# Patient Record
Sex: FEMALE | Race: WHITE | NOT HISPANIC OR LATINO | Employment: PART TIME | URBAN - METROPOLITAN AREA
[De-identification: names, ages, dates, MRNs, and addresses within clinical notes are randomized per-mention and may not be internally consistent; named-entity substitution may affect disease eponyms.]

---

## 2017-05-02 ENCOUNTER — HOSPITAL ENCOUNTER (OUTPATIENT)
Dept: RADIOLOGY | Facility: HOSPITAL | Age: 57
Discharge: HOME | End: 2017-05-02
Attending: OBSTETRICS & GYNECOLOGY
Payer: COMMERCIAL

## 2017-05-02 DIAGNOSIS — N83.209 OVARIAN CYST: ICD-10-CM

## 2017-05-02 PROCEDURE — 76856 US EXAM PELVIC COMPLETE: CPT

## 2017-05-02 PROCEDURE — 76830 TRANSVAGINAL US NON-OB: CPT

## 2017-05-07 ENCOUNTER — GENERIC CONVERSION - ENCOUNTER (OUTPATIENT)
Dept: OTHER | Facility: OTHER | Age: 57
End: 2017-05-07

## 2017-07-19 DIAGNOSIS — D25.9 LEIOMYOMA OF UTERUS: ICD-10-CM

## 2017-07-19 DIAGNOSIS — I10 ESSENTIAL (PRIMARY) HYPERTENSION: ICD-10-CM

## 2017-07-19 DIAGNOSIS — R73.09 OTHER ABNORMAL GLUCOSE: ICD-10-CM

## 2017-07-19 DIAGNOSIS — Z12.31 ENCOUNTER FOR SCREENING MAMMOGRAM FOR MALIGNANT NEOPLASM OF BREAST: ICD-10-CM

## 2017-07-19 DIAGNOSIS — E78.5 HYPERLIPIDEMIA: ICD-10-CM

## 2017-07-21 ENCOUNTER — APPOINTMENT (OUTPATIENT)
Dept: LAB | Facility: CLINIC | Age: 57
End: 2017-07-21
Payer: COMMERCIAL

## 2017-07-21 DIAGNOSIS — I10 ESSENTIAL (PRIMARY) HYPERTENSION: ICD-10-CM

## 2017-07-21 DIAGNOSIS — E78.5 HYPERLIPIDEMIA: ICD-10-CM

## 2017-07-21 DIAGNOSIS — R73.09 OTHER ABNORMAL GLUCOSE: ICD-10-CM

## 2017-07-21 LAB
ALBUMIN SERPL BCP-MCNC: 3.8 G/DL (ref 3.5–5)
ALP SERPL-CCNC: 85 U/L (ref 46–116)
ALT SERPL W P-5'-P-CCNC: 36 U/L (ref 12–78)
ANION GAP SERPL CALCULATED.3IONS-SCNC: 8 MMOL/L (ref 4–13)
AST SERPL W P-5'-P-CCNC: 22 U/L (ref 5–45)
BASOPHILS # BLD AUTO: 0.03 THOUSANDS/ΜL (ref 0–0.1)
BASOPHILS NFR BLD AUTO: 1 % (ref 0–1)
BILIRUB SERPL-MCNC: 0.57 MG/DL (ref 0.2–1)
BUN SERPL-MCNC: 14 MG/DL (ref 5–25)
CALCIUM SERPL-MCNC: 9.5 MG/DL (ref 8.3–10.1)
CHLORIDE SERPL-SCNC: 102 MMOL/L (ref 100–108)
CHOLEST SERPL-MCNC: 179 MG/DL (ref 50–200)
CO2 SERPL-SCNC: 28 MMOL/L (ref 21–32)
CREAT SERPL-MCNC: 0.9 MG/DL (ref 0.6–1.3)
EOSINOPHIL # BLD AUTO: 0.13 THOUSAND/ΜL (ref 0–0.61)
EOSINOPHIL NFR BLD AUTO: 2 % (ref 0–6)
ERYTHROCYTE [DISTWIDTH] IN BLOOD BY AUTOMATED COUNT: 13.1 % (ref 11.6–15.1)
EST. AVERAGE GLUCOSE BLD GHB EST-MCNC: 111 MG/DL
GFR SERPL CREATININE-BSD FRML MDRD: >60 ML/MIN/1.73SQ M
GLUCOSE P FAST SERPL-MCNC: 87 MG/DL (ref 65–99)
HBA1C MFR BLD: 5.5 % (ref 4.2–6.3)
HCT VFR BLD AUTO: 41 % (ref 34.8–46.1)
HDLC SERPL-MCNC: 58 MG/DL (ref 40–60)
HGB BLD-MCNC: 14.1 G/DL (ref 11.5–15.4)
LDLC SERPL CALC-MCNC: 99 MG/DL (ref 0–100)
LYMPHOCYTES # BLD AUTO: 2.13 THOUSANDS/ΜL (ref 0.6–4.47)
LYMPHOCYTES NFR BLD AUTO: 38 % (ref 14–44)
MCH RBC QN AUTO: 29.2 PG (ref 26.8–34.3)
MCHC RBC AUTO-ENTMCNC: 34.4 G/DL (ref 31.4–37.4)
MCV RBC AUTO: 85 FL (ref 82–98)
MONOCYTES # BLD AUTO: 0.54 THOUSAND/ΜL (ref 0.17–1.22)
MONOCYTES NFR BLD AUTO: 10 % (ref 4–12)
NEUTROPHILS # BLD AUTO: 2.76 THOUSANDS/ΜL (ref 1.85–7.62)
NEUTS SEG NFR BLD AUTO: 49 % (ref 43–75)
NRBC BLD AUTO-RTO: 0 /100 WBCS
PLATELET # BLD AUTO: 254 THOUSANDS/UL (ref 149–390)
PMV BLD AUTO: 11 FL (ref 8.9–12.7)
POTASSIUM SERPL-SCNC: 3.9 MMOL/L (ref 3.5–5.3)
PROT SERPL-MCNC: 7.6 G/DL (ref 6.4–8.2)
RBC # BLD AUTO: 4.83 MILLION/UL (ref 3.81–5.12)
SODIUM SERPL-SCNC: 138 MMOL/L (ref 136–145)
TRIGL SERPL-MCNC: 111 MG/DL
TSH SERPL DL<=0.05 MIU/L-ACNC: 1.22 UIU/ML (ref 0.36–3.74)
WBC # BLD AUTO: 5.61 THOUSAND/UL (ref 4.31–10.16)

## 2017-07-21 PROCEDURE — 83036 HEMOGLOBIN GLYCOSYLATED A1C: CPT

## 2017-07-21 PROCEDURE — 80061 LIPID PANEL: CPT

## 2017-07-21 PROCEDURE — 85025 COMPLETE CBC W/AUTO DIFF WBC: CPT

## 2017-07-21 PROCEDURE — 80053 COMPREHEN METABOLIC PANEL: CPT

## 2017-07-21 PROCEDURE — 36415 COLL VENOUS BLD VENIPUNCTURE: CPT

## 2017-07-21 PROCEDURE — 84443 ASSAY THYROID STIM HORMONE: CPT

## 2017-07-28 ENCOUNTER — ALLSCRIPTS OFFICE VISIT (OUTPATIENT)
Dept: OTHER | Facility: OTHER | Age: 57
End: 2017-07-28

## 2017-08-15 ENCOUNTER — ALLSCRIPTS OFFICE VISIT (OUTPATIENT)
Dept: OTHER | Facility: OTHER | Age: 57
End: 2017-08-15

## 2017-10-02 DIAGNOSIS — D25.9 LEIOMYOMA OF UTERUS: ICD-10-CM

## 2017-12-12 ENCOUNTER — APPOINTMENT (OUTPATIENT)
Dept: LAB | Facility: HOSPITAL | Age: 57
End: 2017-12-12
Payer: COMMERCIAL

## 2017-12-12 ENCOUNTER — ALLSCRIPTS OFFICE VISIT (OUTPATIENT)
Dept: OTHER | Facility: OTHER | Age: 57
End: 2017-12-12

## 2017-12-12 DIAGNOSIS — J06.9 ACUTE UPPER RESPIRATORY INFECTION: ICD-10-CM

## 2017-12-12 PROCEDURE — 87070 CULTURE OTHR SPECIMN AEROBIC: CPT

## 2017-12-13 NOTE — PROGRESS NOTES
Assessment    1  Upper respiratory infection, acute (465 9) (J06 9)    Plan  Upper respiratory infection, acute    · Amoxicillin 875 MG Oral Tablet; Take 1 tablet twice daily   · Cheratussin -10 MG/5ML Oral Syrup; TAKE 5 - 10 ML EVERY 4 TO 6HOURS AS NEEDED FOR COUGH   · (1) THROAT CULTURE (CULTURE, UPPER RESPIRATORY); Status:Active; Requestedfor:51Otp3595;     Discussion/Summary    Upper respiratory infection  Throat culture is pending  Will treat with amoxicillin, Robitussin with codeine  I advised patient to use ibuprofen over-the-counter as needed  Advised rest, fluids, gargling  advised patient to contact me in a few days if symptoms are not improving significantly  The patient was counseled regarding instructions for management,-- impressions  Possible side effects of new medications were reviewed with the patient/guardian today  The treatment plan was reviewed with the patient/guardian  The patient/guardian understands and agrees with the treatment plan      Chief Complaint  Patient is here c/o a sore throat, cough, ear pain and nasal congestion x's 5+ days  All medications were reviewed and updated with the patient  History of Present Illness  HPI: cold s/o for a few daysfever now, started with temperature of 101 5cold this fall  sinus pressure, coughing all night , earache, sore throat , swollen glands  3 y/old granddaughter is sick      Review of Systems   Constitutional: feeling poorly-- and-- feeling tired  ENT: earache,-- sore throat-- and-- nasal discharge  Cardiovascular: no complaints of slow or fast heart rate, no chest pain, no palpitations, no leg claudication or lower extremity edema  Respiratory: cough, but-- no shortness of breath-- and-- no wheezing  Gastrointestinal: no complaints of abdominal pain, no constipation, no nausea or diarrhea, no vomiting, no bloody stools  Integumentary: no complaints of skin rash or lesion, no itching or dry skin, no skin wounds    Neurological: no complaints of headache, no confusion, no numbness or tingling, no dizziness or fainting  Active Problems  1  Abnormal glucose (790 29) (R73 09)   2  Cellulitis of leg (682 6) (L03 119)   3  Cellulitis of right lower extremity (682 6) (L03 115)   4  Encounter for annual routine gynecological examination (V72 31) (Z01 419)   5  Encounter for gynecological examination (V72 31) (Z01 419)   6  Encounter for screening mammogram for breast cancer (V76 12) (Z12 31)   7  Hyperlipidemia (272 4) (E78 5)   8  Hypertension (401 9) (I10)   9  Need for prophylactic vaccination and inoculation against influenza (V04 81) (Z23)   10  Obesity (BMI 30 0-34 9) (278 00) (E66 9)   11  Ovarian cyst (620 2) (N83 209)   12  Submucous and subserous leiomyoma of uterus (218 0,218 2) (D25 0,D25 2)   13  Uterine leiomyoma (218 9) (D25 9)    Past Medical History  1  History of anemia (V12 3) (Z86 2)   2  History of hypercholesterolemia (V12 29) (Z86 39)   3  Hypertension (401 9) (I10)   4  History of Neck pain (723 1) (M54 2)  Active Problems And Past Medical History Reviewed: The active problems and past medical history were reviewed and updated today  Family History  Mother    1  Family history of Colon Cancer (V16 0)  Family History Reviewed: The family history was reviewed and updated today  Social History   ·    · Never A Smoker  The social history was reviewed and updated today  Surgical History    1  History of Colonoscopy (Fiberoptic)   2  History of Exploratory Laparoscopy   3  History of Exploratory Laparotomy  Surgical History Reviewed: The surgical history was reviewed and updated today  Current Meds   1  Aspirin 81 MG TABS; Take 1 tablet daily; Therapy: (Recorded:95Zgd0520) to Recorded   2  Calcium 600+D 600-400 MG-UNIT Oral Tablet; Take as directed; Therapy: (Recorded:09Kxj3927) to Recorded   3  Fish Oil 1000 MG Oral Capsule; Therapy: (Recorded:64Okv5874) to Recorded   4

## 2017-12-15 ENCOUNTER — GENERIC CONVERSION - ENCOUNTER (OUTPATIENT)
Dept: OTHER | Facility: OTHER | Age: 57
End: 2017-12-15

## 2017-12-15 LAB — BACTERIA THROAT CULT: NORMAL

## 2018-01-11 NOTE — PROGRESS NOTES
Assessment   1  Hyperlipidemia (272 4) (E78 5)  2  Hypertension (401 9) (I10)  3  Obesity (BMI 30 0-34 9) (278 00) (E66 9)  4  History of Colonoscopy (Fiberoptic)   · 12/09 Jesus Daniels  5  Encounter for preventive health examination (V70 0) (Z00 00)1      1 Amended By: Wendy Pina; Jul 30 2016 10:24 PM EST    Plan  Abnormal glucose, Hyperlipidemia, Hypertension, Obesity (BMI 30 0-34  9)    · (1) HEMOGLOBIN A1C; Status:Resulted - Requires Verification;   Done: 99EZR3608  09:45AM  Hyperlipidemia, Hypertension, Obesity (BMI 30 0-34  9)    · (1) CBC/PLT/DIFF; Status:Resulted - Requires Verification;   Done: 56OZE1756 09:45AM   · (1) COMPREHENSIVE METABOLIC PANEL; Status:Resulted - Requires Verification;    Done: 37SHR1659 09:45AM   · (1) LIPID PANEL FASTING W DIRECT LDL REFLEX; Status:Resulted - Requires  Verification;   Done: 42MZN9296 09:45AM   · (1) TSH; Status:Resulted - Requires Verification;   Done: 88TFX8885 09:45AM    Discussion/Summary  health maintenance visit Currently, she eats a healthy diet and has an inadequate exercise regimen  cervical cancer screening is current Breast cancer screening: mammogram is needed every year  Colorectal cancer screening: colorectal cancer screening is current  Screening lab work includes hemoglobin, glucose, lipid profile and thyroid function testing  Advice and education were given regarding nutrition, aerobic exercise, weight bearing exercise and weight loss  Annual well exam   Hypertension-well controlled, continue lisinopril/HCTZ  Hyperlipidemia-continue pravastatin  We will await results of bloodwork  We had long discussion about weight loss, exercise  Patient is up-to-date with health screenings  Pending gynecological exam and mammography within next few weeks  Follow-up pending labs, annually and as needed  Possible side effects of new medications were reviewed with the patient/guardian today        Chief Complaint  Pt is here for an annual physical  Pt offers no complaints  All meds/allergies reviewed with pt  History of Present Illness  HM, Adult Female: The patient is being seen for a health maintenance evaluation  The last health maintenance visit was 1 year(s) ago  General Health: The patient's health since the last visit is described as good  She has regular dental visits  She denies vision problems  She denies hearing loss  Lifestyle:  She consumes a diverse and healthy diet  She has weight concerns  She does not exercise regularly  She does not use tobacco  She denies drug use  Reproductive health: the patient is postmenopausal    Screening: cancer screening reviewed and updated  metabolic screening reviewed and updated  risk screening reviewed and updated  HPI: Dr Chicas Hazard - GYN, pending OV   mammography - scheduled next week  UTD with colonoscopy     Hyperlipidemia (Follow-Up): Comorbid Illnesses: hypertension  She has no significant interval events  Symptoms: The patient is currently asymptomatic  Hypertension (Follow-Up): The patient presents for follow-up of essential hypertension  The patient states she has been doing well with her blood pressure control since the last visit  Symptoms: The patient is currently asymptomatic  denies impaired vision, denies dyspnea, denies chest pain, denies intermittent leg claudication and denies lower extremity edema  Medications: the patient is adherent with her medication regimen  She denies medication side effects  Medication(s): a diuretic and an ACE inhibitor  The patient is due for a lipid panel  Review of Systems    Constitutional: No fever, no chills, feels well, no tiredness, no recent weight gain or weight loss  Eyes: No complaints of eye pain, no red eyes, no eyesight problems, no discharge, no dry eyes, no itching of eyes  ENT: no complaints of earache, no loss of hearing, no nose bleeds, no nasal discharge, no sore throat, no hoarseness     Cardiovascular: No complaints of slow heart rate, no fast heart rate, no chest pain, no palpitations, no leg claudication, no lower extremity edema  Respiratory: No complaints of shortness of breath, no wheezing, no cough, no SOB on exertion, no orthopnea, no PND  Gastrointestinal: No complaints of abdominal pain, no constipation, no nausea or vomiting, no diarrhea, no bloody stools  Genitourinary: No complaints of dysuria, no incontinence, no pelvic pain, no dysmenorrhea, no vaginal discharge or bleeding  Musculoskeletal: No complaints of arthralgias, no myalgias, no joint swelling or stiffness, no limb pain or swelling  Integumentary: No complaints of skin rash or lesions, no itching, no skin wounds, no breast pain or lump  Neurological: No complaints of headache, no confusion, no convulsions, no numbness, no dizziness or fainting, no tingling, no limb weakness, no difficulty walking  Psychiatric: Not suicidal, no sleep disturbance, no anxiety or depression, no change in personality, no emotional problems  Endocrine: No complaints of proptosis, no hot flashes, no muscle weakness, no deepening of the voice, no feelings of weakness  Hematologic/Lymphatic: No complaints of swollen glands, no swollen glands in the neck, does not bleed easily, does not bruise easily  Active Problems   1  Hyperlipidemia (272 4) (E78 5)  2  Hypertension (401 9) (I10)  3  Obesity (BMI 30 0-34 9) (278 00) (E66 9)    Past Medical History    · History of anemia (V12 3) (Z86 2)   · History of Neck pain (723 1) (M54 2)    Surgical History    · History of Colonoscopy (Fiberoptic)    Family History  Mother    · Family history of Colon Cancer (V16 0)    Social History    ·    · Never A Smoker    Current Meds  1  Aspirin 81 MG TABS; Take 1 tablet daily; Therapy: (Recorded:04Wvp2740) to Recorded  2  Calcium 600+D 600-400 MG-UNIT Oral Tablet; Take as directed; Therapy: (Recorded:84Wrc4491) to Recorded  3   Fish Oil 1000 MG Oral Capsule; Therapy: (Recorded:22Rmj1295) to Recorded  4  Lisinopril-Hydrochlorothiazide 10-12 5 MG Oral Tablet; take one tablet by mouth every   day; Therapy: 87OMH6978 to (Last Rx:12Jun2016)  Requested for: 12Jun2016 Ordered  5  Pravastatin Sodium 20 MG Oral Tablet; take one tablet by mouth every day; Therapy: 48OFP5232 to (Last Rx:12Jun2016)  Requested for: 12Jun2016 Ordered    Allergies   1  No Known Drug Allergies    Vitals   Recorded: 60Nbi8771 09:26AM Recorded: 25LOW7526 41:35HY   Systolic 269 934   Diastolic 74 68   Heart Rate  70   Temperature  98 7 F   Height  5 ft 5 in   Weight  183 lb 8 0 oz   BMI Calculated  30 54   BSA Calculated  1 91     Physical Exam    Constitutional   General appearance: No acute distress, well appearing and well nourished  Head and Face   Head and face: Normal     Eyes   Conjunctiva and lids: No swelling, erythema or discharge  Pupils and irises: Equal, round, reactive to light  Neck   Neck: Supple, symmetric, trachea midline, no masses  Thyroid: Normal, no thyromegaly  Pulmonary   Respiratory effort: No increased work of breathing or signs of respiratory distress  Auscultation of lungs: Clear to auscultation  Cardiovascular   Auscultation of heart: Normal rate and rhythm, normal S1 and S2, no murmurs  Carotid pulses: 2+ bilaterally  Abdominal aorta: Normal     Femoral pulses: 2+ bilaterally  Examination of extremities for edema and/or varicosities: Normal     Chest   Chest: Normal     Abdomen   Abdomen: Non-tender, no masses  Liver and spleen: No hepatomegaly or splenomegaly  Musculoskeletal   Gait and station: Normal     Neurologic   Cranial nerves: Cranial nerves II-XII intact  Psychiatric   Judgment and insight: Normal     Orientation to person, place, and time: Normal     Recent and remote memory: Intact      Mood and affect: Normal        Results/Data  (1) CBC/PLT/DIFF 95ITP5348 09:45AM Gillie December TW Order Number: [id number]     Test Name Result Flag Reference   WBC COUNT 5 65 Thousand/uL  4 31-10 16   RBC COUNT 5 10 Million/uL  3 81-5 12   HEMOGLOBIN 14 9 g/dL  11 5-15 4   HEMATOCRIT 43 3 %  34 8-46  1   MCV 85 fL  82-98   MCH 29 2 pg  26 8-34 3   MCHC 34 4 g/dL  31 4-37 4   RDW 12 9 %  11 6-15 1   MPV 11 0 fL  8 9-12 7   PLATELET COUNT 515 Thousands/uL  149-390   nRBC AUTOMATED 0 /100 WBCs     NEUTROPHILS RELATIVE PERCENT 50 %  43-75   LYMPHOCYTES RELATIVE PERCENT 38 %  14-44   MONOCYTES RELATIVE PERCENT 9 %  4-12   EOSINOPHILS RELATIVE PERCENT 2 %  0-6   BASOPHILS RELATIVE PERCENT 1 %  0-1   NEUTROPHILS ABSOLUTE COUNT 2 79 Thousands/?L  1 85-7 62   LYMPHOCYTES ABSOLUTE COUNT 2 17 Thousands/?L  0 60-4 47   MONOCYTES ABSOLUTE COUNT 0 51 Thousand/?L  0 17-1 22   EOSINOPHILS ABSOLUTE COUNT 0 13 Thousand/?L  0 00-0 61   BASOPHILS ABSOLUTE COUNT 0 03 Thousands/?L  0 00-0 10   - Patient Instructions: This bloodwork is non-fasting  Please drink two glasses of water morning of bloodwork  - Patient Instructions: This bloodwork is non-fasting  Please drink two glasses of water morning of bloodwork  Future Appointments    Date/Time Provider Specialty Site   07/28/2017 08:00 AM ROCHELLE Hughes  Family Medicine [address] [place] [address]     Signatures   Electronically signed by :  ROCHELLE Daugherty ; Jul 30 2016 10:25PM EST                       (Author)

## 2018-01-11 NOTE — RESULT NOTES
Verified Results  * [facility]PLETE [facility]) 47HNG4287 10:41AM Yovani Capellan Order Number: [id number]    - Patient Instructions: To schedule this appointment, please contact Central Scheduling at [phone]   Order Number: [id number]    - Patient Instructions: To schedule this appointment, please contact Central Scheduling at [phone]  Test Name Result Flag Reference   US PELVIS COMPLETE (TRANSABDOMINAL AND TRANSVAGINAL) (Report)     PELVIC ULTRASOUND, COMPLETE     INDICATION: Ovarian cyst  History of fibroids  LMP at age 47  COMPARISON: Pelvic ultrasound 6/2/2011     TECHNIQUE:  Transabdominal pelvic ultrasound was performed in sagittal and transverse planes with a curvilinear transducer  Additional transvaginal imaging was performed to better evaluate the endometrium and ovaries  Imaging included volumetric    sweeps as well as traditional still imaging technique  FINDINGS:     UTERUS:   The uterus is anteverted in position, measuring 11 6 x 4 9 x 5 3 cm  Heterogeneous myometrium  4 3 x 4 4 x 4 7 cm posterior fundal fibroid, previously 3 9 x 3 4 x 3 8 cm  This appears to displace the endometrial stripe anteriorly  The cervix shows no suspicious abnormality  Small nabothian cysts  ENDOMETRIUM:    Endometrium measures 7 mm thickness  Homogenous without focal mass detected  OVARIES/ADNEXA:   Right ovary: 2 5 x 1 8 x 2 3 cm  1 4 x 1 0 x 1 7 cm hyperechoic focus is evident, less conspicuous  Previously a 1 9 x 1 8 x 2 1 cm heterogeneously hyperechoic focus was noted  Again this may represent a dermoid  Doppler flow within normal limits  Left ovary: Not visualized  No suspicious adnexal mass or loculated collections  There is no free fluid  IMPRESSION:      1  Thickening of the endometrium measuring 7 mm  Correlate with any clinical symptoms and consider biopsy       2  4 7 cm posterior fundal fibroid, mildly increased from prior study  Given enlargement from the previous study in a postmenopausal female, closer ultrasound surveillance is warranted  Recommend follow-up ultrasound in 3-6 months  3  Probable right ovarian dermoid, less conspicuous         ##sigslh##sigslh       Workstation performed: [id number]     Signed by:   Cheryl Moise DO   5/3/17

## 2018-01-11 NOTE — PROGRESS NOTES
Chief Complaint  Patient is here to receive the influenza vaccination  Active Problems    1  Abnormal glucose (790 29) (R73 09)   2  Cellulitis of leg (682 6) (L03 119)   3  Cellulitis of right lower extremity (682 6) (L03 115)   4  Encounter for gynecological examination (V72 31) (Z01 419)   5  Encounter for screening mammogram for breast cancer (V76 12) (Z12 31)   6  Hyperlipidemia (272 4) (E78 5)   7  Hypertension (401 9) (I10)   8  Need for prophylactic vaccination and inoculation against influenza (V04 81) (Z23)   9  Obesity (BMI 30 0-34 9) (278 00) (E66 9)   10  Ovarian cyst (620 2) (N83 209)    Current Meds   1  Aspirin 81 MG TABS; Take 1 tablet daily; Therapy: (Recorded:93Erh2995) to Recorded   2  Calcium 600+D 600-400 MG-UNIT Oral Tablet; Take as directed; Therapy: (Recorded:97Aut3706) to Recorded   3  Doxycycline Hyclate 100 MG Oral Capsule; TAKE 1 CAPSULE Twice daily After Meals; Therapy: 31GIS4818 to (Complete:17Nov2016)  Requested for: 92ITH9079; Last   Rx:03Nov2016 Ordered   4  Fish Oil 1000 MG Oral Capsule; Therapy: (Recorded:41Kyc0481) to Recorded   5  Lisinopril-Hydrochlorothiazide 10-12 5 MG Oral Tablet; take one tablet by mouth every   day; Therapy: 33GJL0555 to (Last Rx:09Pth7540)  Requested for: 42Qmh6265 Ordered   6  Pravastatin Sodium 20 MG Oral Tablet; take one tablet by mouth every day; Therapy: 79OZO7590 to (Last Rx:81Myv2756)  Requested for: 73Wll2317 Ordered    Allergies    1  No Known Drug Allergies    Vitals  Signs    Temperature: 97 2 F    Plan  Need for prophylactic vaccination and inoculation against influenza    · Fluzone Quadrivalent 0 5 ML Intramuscular Suspension    Future Appointments    Date/Time Provider Specialty Site   07/28/2017 08:00 AM ROCHELLE Zamorano  Family Medicine [address] [place] [address]     Signatures   Electronically signed by :  ROCHELLE Gonzalez ; Nov 8 2016  4:37PM EST                       (Author)

## 2018-01-13 VITALS
BODY MASS INDEX: 31.65 KG/M2 | DIASTOLIC BLOOD PRESSURE: 78 MMHG | HEIGHT: 65 IN | SYSTOLIC BLOOD PRESSURE: 120 MMHG | WEIGHT: 190 LBS

## 2018-01-16 NOTE — PROGRESS NOTES
Assessment    1  Encounter for preventive health examination (V70 0) (Z00 00)   2  Hyperlipidemia (272 4) (E78 5)   3  Hypertension (401 9) (I10)   4  Obesity (BMI 30 0-34 9) (278 00) (E66 9)    Plan  Hyperlipidemia, Hypertension, Obesity (BMI 30 0-34  9)    · (1) CBC/PLT/DIFF; Status:Active; Requested [id number];    · (1) COMPREHENSIVE METABOLIC PANEL; Status:Active; Requested [id number];    · (1) LIPID PANEL FASTING W DIRECT LDL REFLEX; Status:Active; Requested  [id number];    · (1) TSH; Status:Active; Requested [id number]; Discussion/Summary  health maintenance visit Currently, she eats a healthy diet  cervical cancer screening is current cervical cancer screening is needed every year Breast cancer screening: mammogram is current and mammogram is needed every year  Colorectal cancer screening: the risks and benefits of colorectal cancer screening were discussed, colonoscopy has been ordered, colorectal cancer screening is managed by Dr Daniels and Patient is past due colonoscopy  Screening lab work includes hemoglobin, glucose, lipid profile and thyroid function testing  Annual well exam   We discussed exercise, healthy diet, weight management  Patient is up-to-date with gynecological exam and mammography ; she should be scheduling follow-up colonoscopy now  Hypertension-continue lisinopril HCTZ  Hyperlipidemia-continue pravastatin  Follow up annually and as needed  Possible side effects of new medications were reviewed with the patient/guardian today  The treatment plan was reviewed with the patient/guardian  The patient/guardian understands and agrees with the treatment plan      Chief Complaint  Patient is here for a yearly physical  All medications were reviewed and updated with the patient  History of Present Illness  , Adult Female: The patient is being seen for a health maintenance evaluation  The last health maintenance visit was 1 year(s) ago  General Health:  The patient's health since the last visit is described as good  Lifestyle:  She consumes a diverse and healthy diet  She has weight concerns  She does not exercise regularly  She does not use tobacco    Reproductive health: the patient is postmenopausal    Screening: cancer screening reviewed and updated  metabolic screening reviewed and updated  risk screening reviewed and updated  HPI: pending GYN exam and mammo in 8/2017   Pelvic US in 5/2017   feels well   Patient remains on the medications for hypertension and hyperlipidemia  She denies chest pain, palpitations, shortness of breath or dizziness  Patient is concerned about excessive weight and is committed to start cholesterol changes, exercise and healthy diet  Review of Systems    Constitutional: No fever, no chills, feels well, no tiredness, no recent weight gain or weight loss  Eyes: No complaints of eye pain, no red eyes, no eyesight problems, no discharge, no dry eyes, no itching of eyes  ENT: no complaints of earache, no loss of hearing, no nose bleeds, no nasal discharge, no sore throat, no hoarseness  Cardiovascular: No complaints of slow heart rate, no fast heart rate, no chest pain, no palpitations, no leg claudication, no lower extremity edema  Respiratory: No complaints of shortness of breath, no wheezing, no cough, no SOB on exertion, no orthopnea, no PND  Gastrointestinal: No complaints of abdominal pain, no constipation, no nausea or vomiting, no diarrhea, no bloody stools  Genitourinary: No complaints of dysuria, no incontinence, no pelvic pain, no dysmenorrhea, no vaginal discharge or bleeding  Musculoskeletal: No complaints of arthralgias, no myalgias, no joint swelling or stiffness, no limb pain or swelling  Integumentary: No complaints of skin rash or lesions, no itching, no skin wounds, no breast pain or lump     Neurological: No complaints of headache, no confusion, no convulsions, no numbness, no dizziness or fainting, no tingling, no limb weakness, no difficulty walking  Psychiatric: Not suicidal, no sleep disturbance, no anxiety or depression, no change in personality, no emotional problems  Endocrine: No complaints of proptosis, no hot flashes, no muscle weakness, no deepening of the voice, no feelings of weakness  Hematologic/Lymphatic: No complaints of swollen glands, no swollen glands in the neck, does not bleed easily, does not bruise easily  Active Problems    1  Abnormal glucose (790 29) (R73 09)   2  Cellulitis of leg (682 6) (L03 119)   3  Cellulitis of right lower extremity (682 6) (L03 115)   4  Encounter for gynecological examination (V72 31) (Z01 419)   5  Encounter for screening mammogram for breast cancer (V76 12) (Z12 31)   6  Hyperlipidemia (272 4) (E78 5)   7  Hypertension (401 9) (I10)   8  Need for prophylactic vaccination and inoculation against influenza (V04 81) (Z23)   9  Obesity (BMI 30 0-34 9) (278 00) (E66 9)   10  Ovarian cyst (620 2) (N83 209)   11  Uterine leiomyoma (218 9) (D25 9)    Past Medical History    · History of anemia (V12 3) (Z86 2)   · History of hypercholesterolemia (V12 29) (Z86 39)   · Hypertension (401 9) (I10)   · History of Neck pain (723 1) (M54 2)    Surgical History    · History of Colonoscopy (Fiberoptic)   · History of Exploratory Laparoscopy   · History of Exploratory Laparotomy    Family History  Mother    · Family history of Colon Cancer (V16 0)    Social History    ·    · Never A Smoker    Current Meds   1  Aspirin 81 MG TABS; Take 1 tablet daily; Therapy: (Recorded:01Rxe3811) to Recorded   2  Calcium 600+D 600-400 MG-UNIT Oral Tablet; Take as directed; Therapy: (Recorded:56Ywm3927) to Recorded   3  Fish Oil 1000 MG Oral Capsule; Therapy: (Recorded:60Gey4255) to Recorded   4  Lisinopril-Hydrochlorothiazide 10-12 5 MG Oral Tablet; take one tablet by mouth every   day;    Therapy: 79MFA3492 to (Last Rx:86Qed6189)  Requested for: 07Sep2016 Ordered   5  Pravastatin Sodium 20 MG Oral Tablet; take one tablet by mouth every day; Therapy: 45RCG3560 to (Last Rx:99Usu2625)  Requested for: 88Acw0876 Ordered    Allergies    1  No Known Drug Allergies    Vitals   Recorded: 28Jul2017 08:01AM   Temperature 98 3 F   Heart Rate 64   Respiration 12   Systolic 242   Diastolic 82   Height 5 ft 5 in   Weight 189 lb 8 oz   BMI Calculated 31 53   BSA Calculated 1 93     Physical Exam    Constitutional   General appearance: No acute distress, well appearing and well nourished  Eyes   Conjunctiva and lids: No swelling, erythema or discharge  Pupils and irises: Equal, round, reactive to light  Ears, Nose, Mouth, and Throat   External inspection of ears and nose: Normal     Neck   Neck: Supple, symmetric, trachea midline, no masses  Thyroid: Normal, no thyromegaly  Pulmonary   Respiratory effort: No increased work of breathing or signs of respiratory distress  Auscultation of lungs: Clear to auscultation  Cardiovascular   Auscultation of heart: Normal rate and rhythm, normal S1 and S2, no murmurs  Carotid pulses: 2+ bilaterally  Abdominal aorta: Normal     Examination of extremities for edema and/or varicosities: Normal     Chest   Chest: Normal     Abdomen   Abdomen: Non-tender, no masses  Liver and spleen: No hepatomegaly or splenomegaly  Musculoskeletal   Gait and station: Normal     Neurologic   Cranial nerves: Cranial nerves II-XII intact  Psychiatric   Judgment and insight: Normal     Orientation to person, place, and time: Normal     Recent and remote memory: Intact  Mood and affect: Normal        Results/Data  (1) CBC/PLT/DIFF 10Xla3631 09:11AM Shana Marcelluss Order Number: [id number]     Test Name Result Flag Reference   WBC COUNT 5 61 Thousand/uL  4 31-10 16   RBC COUNT 4 83 Million/uL  3 81-5 12   HEMOGLOBIN 14 1 g/dL  11 5-15 4   HEMATOCRIT 41 0 %  34 8-46  1   MCV 85 fL  82-98   MCH 29 2 pg  26 8-34 3 MCHC 34 4 g/dL  31 4-37 4   RDW 13 1 %  11 6-15 1   MPV 11 0 fL  8 9-12 7   PLATELET COUNT 472 Thousands/uL  149-390   nRBC AUTOMATED 0 /100 WBCs     NEUTROPHILS RELATIVE PERCENT 49 %  43-75   LYMPHOCYTES RELATIVE PERCENT 38 %  14-44   MONOCYTES RELATIVE PERCENT 10 %  4-12   EOSINOPHILS RELATIVE PERCENT 2 %  0-6   BASOPHILS RELATIVE PERCENT 1 %  0-1   NEUTROPHILS ABSOLUTE COUNT 2 76 Thousands/? ??L  1 85-7 62   LYMPHOCYTES ABSOLUTE COUNT 2 13 Thousands/? ??L  0 60-4 47   MONOCYTES ABSOLUTE COUNT 0 54 Thousand/? ??L  0 17-1 22   EOSINOPHILS ABSOLUTE COUNT 0 13 Thousand/? ??L  0 00-0 61   BASOPHILS ABSOLUTE COUNT 0 03 Thousands/? ??L  0 00-0 10     (1) COMPREHENSIVE METABOLIC PANEL 03BEB9292 39:55YQ Wendy MICHELE Order Number: [id number]     Test Name Result Flag Reference   SODIUM 138 mmol/L  136-145   POTASSIUM 3 9 mmol/L  3 5-5 3   CHLORIDE 102 mmol/L  100-108   CARBON DIOXIDE 28 mmol/L  21-32   ANION GAP (CALC) 8 mmol/L  4-13   BLOOD UREA NITROGEN 14 mg/dL  5-25   CREATININE 0 90 mg/dL  0 60-1 30   Standardized to IDMS reference method   CALCIUM 9 5 mg/dL  8 3-10 1   BILI, TOTAL 0 57 mg/dL  0 20-1 00   ALK PHOSPHATAS 85 U/L     ALT (SGPT) 36 U/L  12-78   AST(SGOT) 22 U/L  5-45   ALBUMIN 3 8 g/dL  3 5-5 0   TOTAL PROTEIN 7 6 g/dL  6 4-8 2   eGFR Non-African American      >60 0 ml/min/1 73sq m   [facility] Disease Education Program recommendations are as follows:  GFR calculation is accurate only with a steady state creatinine  Chronic Kidney disease less than 60 ml/min/1 73 sq  meters  Kidney failure less than 15 ml/min/1 73 sq  meters  GLUCOSE FASTING 87 mg/dL  65-99     (1) TSH 71Ivl6832 09:11AM Wendy MICHELE Order Number: [id number]     Test Name Result Flag Reference   TSH 1 220 uIU/mL  0 358-3 740   Patients undergoing fluorescein dye angiography may retain small amounts of fluorescein in the body for 48-72 hours post procedure   Samples containing fluorescein can produce falsely depressed TSH values  If the patient had this procedure,a specimen should be resubmitted post fluorescein clearance  The recommended reference ranges for TSH during pregnancy are as follows:  First trimester 0 1 to 2 5 uIU/mL  Second trimester  0 2 to 3 0 uIU/mL  Third trimester 0 3 to 3 0 uIU/m     (1) LIPID PANEL FASTING W DIRECT LDL REFLEX 10Kep1783 09:11AM [employer] Order Number: [id number]     Test Name Result Flag Reference   CHOLESTEROL 179 mg/dL     LDL CHOLESTEROL CALCULATED 99 mg/dL  0-100   Triglyceride:         Normal              <150 mg/dl       Borderline High    150-199 mg/dl       High               200-499 mg/dl       Very High          >499 mg/dl  Cholesterol:         Desirable        <200 mg/dl      Borderline High  200-239 mg/dl      High             >239 mg/dl  HDL Cholesterol:        High    >59 mg/dL      Low     <41 mg/dL  LDL Cholesterol:        Optimal          <100 mg/dl        Near Optimal     100-129 mg/dl        Above Optimal          Borderline High   130-159 mg/dl          High              160-189 mg/dl          Very High        >189 mg/dl  LDL CALCULATED:    This screening LDL is a calculated result  It does not have the accuracy of the Direct Measured LDL in the monitoring of patients with hyperlipidemia and/or statin therapy  Direct Measure LDL (SZX101) must be ordered separately in these patients  TRIGLYCERIDES 111 mg/dL  <=150   Specimen collection should occur prior to N-Acetylcysteine or Metamizole administration due to the potential for falsely depressed results  HDL,DIRECT 58 mg/dL  40-60   Specimen collection should occur prior to Metamizole administration due to the potential for falsely depressed results  (1) HEMOGLOBIN A1C 40Evk8823 09:11AM [employer] Order Number: [id number]     Test Name Result Flag Reference   HEMOGLOBIN A1C 5 5 %  4 2-6 3   EST  AVG   GLUCOSE 111 mg/dl Future Appointments    Date/Time Provider Specialty Site   08/15/2017 08:30 AM Pepper Simmons, DO Obstetrics/Gynecology [facility]   07/30/2018 08:00 AM ROCHELLE Salvador  Family Medicine [address] [place] [address]     Signatures   Electronically signed by :  ROCHELLE Crowley ; Jul 30 2017 11:00PM EST                       (Author)

## 2018-01-18 NOTE — RESULT NOTES
Verified Results  (1) CBC/PLT/DIFF 80Nwx7117 09:45AM Rhys Valadez Order Number: [id number]     Test Name Result Flag Reference   WBC COUNT 5 65 Thousand/uL  4 31-10 16   RBC COUNT 5 10 Million/uL  3 81-5 12   HEMOGLOBIN 14 9 g/dL  11 5-15 4   HEMATOCRIT 43 3 %  34 8-46  1   MCV 85 fL  82-98   MCH 29 2 pg  26 8-34 3   MCHC 34 4 g/dL  31 4-37 4   RDW 12 9 %  11 6-15 1   MPV 11 0 fL  8 9-12 7   PLATELET COUNT 942 Thousands/uL  149-390   nRBC AUTOMATED 0 /100 WBCs     NEUTROPHILS RELATIVE PERCENT 50 %  43-75   LYMPHOCYTES RELATIVE PERCENT 38 %  14-44   MONOCYTES RELATIVE PERCENT 9 %  4-12   EOSINOPHILS RELATIVE PERCENT 2 %  0-6   BASOPHILS RELATIVE PERCENT 1 %  0-1   NEUTROPHILS ABSOLUTE COUNT 2 79 Thousands/?L  1 85-7 62   LYMPHOCYTES ABSOLUTE COUNT 2 17 Thousands/?L  0 60-4 47   MONOCYTES ABSOLUTE COUNT 0 51 Thousand/?L  0 17-1 22   EOSINOPHILS ABSOLUTE COUNT 0 13 Thousand/?L  0 00-0 61   BASOPHILS ABSOLUTE COUNT 0 03 Thousands/?L  0 00-0 10   - Patient Instructions: This bloodwork is non-fasting  Please drink two glasses of water morning of bloodwork  - Patient Instructions: This bloodwork is non-fasting  Please drink two glasses of water morning of bloodwork  (1) COMPREHENSIVE METABOLIC PANEL 15OGQ6416 37:80JE Susanna Stroud    Order Number: [id number]     Test Name Result Flag Reference   GLUCOSE,RANDM 92 mg/dL     If the patient is fasting, the ADA then defines impaired fasting glucose as > 100 mg/dL and diabetes as > or equal to 123 mg/dL     SODIUM 139 mmol/L  136-145   POTASSIUM 4 3 mmol/L  3 5-5 3   CHLORIDE 105 mmol/L  100-108   CARBON DIOXIDE 28 mmol/L  21-32   ANION GAP (CALC) 6 mmol/L  4-13   BLOOD UREA NITROGEN 15 mg/dL  5-25   CREATININE 0 92 mg/dL  0 60-1 30   Standardized to IDMS reference method   CALCIUM 9 4 mg/dL  8 3-10 1   BILI, TOTAL 0 48 mg/dL  0 20-1 00   ALK PHOSPHATAS 87 U/L     ALT (SGPT) 37 U/L  12-78   AST(SGOT) 22 U/L  5-45 ALBUMIN 3 8 g/dL  3 5-5 0   TOTAL PROTEIN 7 5 g/dL  6 4-8 2   eGFR Non-African American      >60 0 ml/min/1 73sq m   - Patient Instructions: This is a fasting blood test  Water, black tea or black coffee only after 9:00pm the night before test Drink 2 glasses of water the morning of test - Patient Instructions: This bloodwork is non-fasting  Please drink two glasses of   water morning of bloodwork  National Kidney Disease Education Program recommendations are as follows:  GFR calculation is accurate only with a steady state creatinine  Chronic Kidney disease less than 60 ml/min/1 73 sq  meters  Kidney failure less than 15 ml/min/1 73 sq  meters  (1) TSH [id number] 09:45AM Mi Olivera Order Number: [id number]     Test Name Result Flag Reference   TSH 1 190 uIU/mL  0 358-3 740   - Patient Instructions: This bloodwork is non-fasting  Please drink two glasses of water morning of bloodwork  - Patient Instructions: This is a fasting blood test  Water, black tea or black coffee only after 9:00pm the night before test Drink 2 glasses of water the morning of test - Patient Instructions: This bloodwork is non-fasting  Please drink two glasses of   water morning of bloodwork  Patients undergoing fluorescein dye angiography may retain small amounts of fluorescein in the body for 48-72 hours post procedure  Samples containing fluorescein can produce falsely depressed TSH values  If the patient had this procedure,a specimen should be resubmitted post fluorescein clearance            The recommended reference ranges for TSH during pregnancy are as follows:  First trimester 0 1 to 2 5 uIU/mL  Second trimester  0 2 to 3 0 uIU/mL  Third trimester 0 3 to 3 0 uIU/m     (1) LIPID PANEL FASTING W DIRECT LDL REFLEX [id number] 09:45AM Mi Olivera Order Number: [id number]     Test Name Result Flag Reference   CHOLESTEROL 188 mg/dL     LDL CHOLESTEROL CALCULATED 108 mg/dL H 0-100   - Patient Instructions: This is a fasting blood test  Water, black tea or black coffee only after 9:00pm the night before test   Drink 2 glasses of water the morning of test     - Patient Instructions: This is a fasting blood test  Water, black tea or black coffee only after 9:00pm the night before test Drink 2 glasses of water the morning of test - Patient Instructions: This bloodwork is non-fasting  Please drink two glasses of   water morning of bloodwork  Triglyceride:         Normal              <150 mg/dl       Borderline High    150-199 mg/dl       High               200-499 mg/dl       Very High          >499 mg/dl  Cholesterol:         Desirable        <200 mg/dl      Borderline High  200-239 mg/dl      High             >239 mg/dl  HDL Cholesterol:        High    >59 mg/dL      Low     <41 mg/dL  LDL Cholesterol:        Optimal          <100 mg/dl        Near Optimal     100-129 mg/dl        Above Optimal          Borderline High   130-159 mg/dl          High              160-189 mg/dl          Very High        >189 mg/dl  LDL CALCULATED:    This screening LDL is a calculated result  It does not have the accuracy of the Direct Measured LDL in the monitoring of patients with hyperlipidemia and/or statin therapy  Direct Measure LDL (TXY950) must be ordered separately in these patients  TRIGLYCERIDES 109 mg/dL  <=150   Specimen collection should occur prior to N-Acetylcysteine or Metamizole administration due to the potential for falsely depressed results  HDL,DIRECT 58 mg/dL  40-60   Specimen collection should occur prior to Metamizole administration due to the potential for falsely depressed results  (1) HEMOGLOBIN A1C 38Hte4799 09:45AM Inna Almazan Order Number: [id number]     Test Name Result Flag Reference   HEMOGLOBIN A1C 5 5 %  4 2-6 3   EST  AVG   GLUCOSE 111 mg/dl         Discussion/Summary   Dear Paul Luna,      All blood work is normal     Please continue same daily medications  Thank you      Signatures   Electronically signed by :  ROCHELLE Linda ; Jul 31 2016 11:04AM EST                       (Author)

## 2018-01-22 VITALS
WEIGHT: 189.5 LBS | HEART RATE: 64 BPM | DIASTOLIC BLOOD PRESSURE: 82 MMHG | TEMPERATURE: 98.3 F | SYSTOLIC BLOOD PRESSURE: 122 MMHG | BODY MASS INDEX: 31.57 KG/M2 | HEIGHT: 65 IN | RESPIRATION RATE: 12 BRPM

## 2018-01-23 VITALS
WEIGHT: 195.25 LBS | BODY MASS INDEX: 32.53 KG/M2 | HEIGHT: 65 IN | DIASTOLIC BLOOD PRESSURE: 78 MMHG | HEART RATE: 88 BPM | RESPIRATION RATE: 14 BRPM | SYSTOLIC BLOOD PRESSURE: 128 MMHG | TEMPERATURE: 99.1 F

## 2018-01-23 NOTE — RESULT NOTES
Verified Results  (1) THROAT CULTURE (CULTURE, UPPER RESPIRATORY) [id number] 09:52PM Samuel Jackson    Order Number: [id number]     Test Name Result Flag Reference   CLINICAL REPORT (Report)     Test:        Throat culture  Specimen Type:   Throat  Specimen Date:   12/12/2017 9:52 PM  Result Date:    12/15/2017 7:55 AM  Result Status:   Final result  Resulting Lab:   [address] [address]            Tel: [phone]      CULTURE                                       ------------------                                   Negative for beta-hemolytic Streptococcus       Signatures   Electronically signed by :  ROCHELLE Roman ; Dec 15 2017  3:21PM EST                       (Author)

## 2018-04-27 ENCOUNTER — HOSPITAL ENCOUNTER (OUTPATIENT)
Dept: RADIOLOGY | Facility: HOSPITAL | Age: 58
Discharge: HOME | End: 2018-04-27
Attending: OBSTETRICS & GYNECOLOGY
Payer: COMMERCIAL

## 2018-04-27 DIAGNOSIS — D25.9 LEIOMYOMA OF UTERUS: ICD-10-CM

## 2018-04-27 PROCEDURE — 76830 TRANSVAGINAL US NON-OB: CPT

## 2018-04-27 PROCEDURE — 76856 US EXAM PELVIC COMPLETE: CPT

## 2018-04-30 ENCOUNTER — HOSPITAL ENCOUNTER (OUTPATIENT)
Dept: RADIOLOGY | Facility: HOSPITAL | Age: 58
Discharge: HOME | End: 2018-04-30
Attending: OBSTETRICS & GYNECOLOGY
Payer: COMMERCIAL

## 2018-04-30 ENCOUNTER — APPOINTMENT (OUTPATIENT)
Dept: RADIOLOGY | Facility: HOSPITAL | Age: 58
End: 2018-04-30
Attending: OBSTETRICS & GYNECOLOGY
Payer: COMMERCIAL

## 2018-04-30 DIAGNOSIS — Z12.31 ENCOUNTER FOR SCREENING MAMMOGRAM FOR MALIGNANT NEOPLASM OF BREAST: ICD-10-CM

## 2018-04-30 PROCEDURE — 77067 SCR MAMMO BI INCL CAD: CPT

## 2018-05-03 ENCOUNTER — TELEPHONE (OUTPATIENT)
Dept: OBGYN CLINIC | Facility: CLINIC | Age: 58
End: 2018-05-03

## 2018-05-03 NOTE — TELEPHONE ENCOUNTER
----- Message from Cassy Isidro DO sent at 5/2/2018  3:26 PM EDT -----  Inform pt US reveals stable fibroid, small ovarian cyst no change, resume annual gyn exam

## 2018-05-07 ENCOUNTER — TELEPHONE (OUTPATIENT)
Dept: OBGYN CLINIC | Facility: CLINIC | Age: 58
End: 2018-05-07

## 2018-06-02 DIAGNOSIS — E66.9 OBESITY: ICD-10-CM

## 2018-06-02 DIAGNOSIS — I10 ESSENTIAL (PRIMARY) HYPERTENSION: ICD-10-CM

## 2018-06-02 DIAGNOSIS — E78.5 HYPERLIPIDEMIA: ICD-10-CM

## 2018-07-25 ENCOUNTER — APPOINTMENT (OUTPATIENT)
Dept: LAB | Facility: CLINIC | Age: 58
End: 2018-07-25
Payer: COMMERCIAL

## 2018-07-25 ENCOUNTER — TRANSCRIBE ORDERS (OUTPATIENT)
Dept: LAB | Facility: CLINIC | Age: 58
End: 2018-07-25

## 2018-07-25 DIAGNOSIS — Z00.8 HEALTH EXAMINATION IN POPULATION SURVEY: Primary | ICD-10-CM

## 2018-07-25 DIAGNOSIS — Z00.8 HEALTH EXAMINATION IN POPULATION SURVEY: ICD-10-CM

## 2018-07-25 DIAGNOSIS — I10 ESSENTIAL (PRIMARY) HYPERTENSION: ICD-10-CM

## 2018-07-25 DIAGNOSIS — E78.5 HYPERLIPIDEMIA: ICD-10-CM

## 2018-07-25 DIAGNOSIS — E66.9 OBESITY: ICD-10-CM

## 2018-07-25 LAB
ALBUMIN SERPL BCP-MCNC: 3.5 G/DL (ref 3.5–5)
ALP SERPL-CCNC: 76 U/L (ref 46–116)
ALT SERPL W P-5'-P-CCNC: 37 U/L (ref 12–78)
ANION GAP SERPL CALCULATED.3IONS-SCNC: 5 MMOL/L (ref 4–13)
AST SERPL W P-5'-P-CCNC: 18 U/L (ref 5–45)
BASOPHILS # BLD AUTO: 0.04 THOUSANDS/ΜL (ref 0–0.1)
BASOPHILS NFR BLD AUTO: 1 % (ref 0–1)
BILIRUB SERPL-MCNC: 0.44 MG/DL (ref 0.2–1)
BUN SERPL-MCNC: 13 MG/DL (ref 5–25)
CALCIUM SERPL-MCNC: 9.1 MG/DL (ref 8.3–10.1)
CHLORIDE SERPL-SCNC: 104 MMOL/L (ref 100–108)
CHOLEST SERPL-MCNC: 176 MG/DL (ref 50–200)
CHOLEST SERPL-MCNC: 179 MG/DL (ref 50–200)
CO2 SERPL-SCNC: 30 MMOL/L (ref 21–32)
CREAT SERPL-MCNC: 0.94 MG/DL (ref 0.6–1.3)
EOSINOPHIL # BLD AUTO: 0.19 THOUSAND/ΜL (ref 0–0.61)
EOSINOPHIL NFR BLD AUTO: 4 % (ref 0–6)
ERYTHROCYTE [DISTWIDTH] IN BLOOD BY AUTOMATED COUNT: 12.5 % (ref 11.6–15.1)
EST. AVERAGE GLUCOSE BLD GHB EST-MCNC: 111 MG/DL
GFR SERPL CREATININE-BSD FRML MDRD: 67 ML/MIN/1.73SQ M
GLUCOSE P FAST SERPL-MCNC: 91 MG/DL (ref 65–99)
HBA1C MFR BLD: 5.5 % (ref 4.2–6.3)
HCT VFR BLD AUTO: 42 % (ref 34.8–46.1)
HDLC SERPL-MCNC: 54 MG/DL (ref 40–60)
HDLC SERPL-MCNC: 59 MG/DL (ref 40–60)
HGB BLD-MCNC: 14 G/DL (ref 11.5–15.4)
IMM GRANULOCYTES # BLD AUTO: 0.02 THOUSAND/UL (ref 0–0.2)
IMM GRANULOCYTES NFR BLD AUTO: 0 % (ref 0–2)
LDLC SERPL CALC-MCNC: 102 MG/DL (ref 0–100)
LDLC SERPL CALC-MCNC: 110 MG/DL (ref 0–100)
LYMPHOCYTES # BLD AUTO: 2.25 THOUSANDS/ΜL (ref 0.6–4.47)
LYMPHOCYTES NFR BLD AUTO: 44 % (ref 14–44)
MCH RBC QN AUTO: 28.9 PG (ref 26.8–34.3)
MCHC RBC AUTO-ENTMCNC: 33.3 G/DL (ref 31.4–37.4)
MCV RBC AUTO: 87 FL (ref 82–98)
MONOCYTES # BLD AUTO: 0.42 THOUSAND/ΜL (ref 0.17–1.22)
MONOCYTES NFR BLD AUTO: 8 % (ref 4–12)
NEUTROPHILS # BLD AUTO: 2.23 THOUSANDS/ΜL (ref 1.85–7.62)
NEUTS SEG NFR BLD AUTO: 43 % (ref 43–75)
NONHDLC SERPL-MCNC: 117 MG/DL
NRBC BLD AUTO-RTO: 0 /100 WBCS
PLATELET # BLD AUTO: 257 THOUSANDS/UL (ref 149–390)
PMV BLD AUTO: 11 FL (ref 8.9–12.7)
POTASSIUM SERPL-SCNC: 4.1 MMOL/L (ref 3.5–5.3)
PROT SERPL-MCNC: 7.2 G/DL (ref 6.4–8.2)
RBC # BLD AUTO: 4.85 MILLION/UL (ref 3.81–5.12)
SODIUM SERPL-SCNC: 139 MMOL/L (ref 136–145)
TRIGL SERPL-MCNC: 73 MG/DL
TRIGL SERPL-MCNC: 74 MG/DL
TSH SERPL DL<=0.05 MIU/L-ACNC: 1.45 UIU/ML (ref 0.36–3.74)
WBC # BLD AUTO: 5.15 THOUSAND/UL (ref 4.31–10.16)

## 2018-07-25 PROCEDURE — 83036 HEMOGLOBIN GLYCOSYLATED A1C: CPT

## 2018-07-25 PROCEDURE — 36415 COLL VENOUS BLD VENIPUNCTURE: CPT

## 2018-07-25 PROCEDURE — 80061 LIPID PANEL: CPT

## 2018-07-25 PROCEDURE — 85025 COMPLETE CBC W/AUTO DIFF WBC: CPT

## 2018-07-25 PROCEDURE — 80053 COMPREHEN METABOLIC PANEL: CPT

## 2018-07-25 PROCEDURE — 84443 ASSAY THYROID STIM HORMONE: CPT

## 2018-08-02 ENCOUNTER — OFFICE VISIT (OUTPATIENT)
Dept: FAMILY MEDICINE CLINIC | Facility: CLINIC | Age: 58
End: 2018-08-02
Payer: COMMERCIAL

## 2018-08-02 VITALS
HEIGHT: 65 IN | DIASTOLIC BLOOD PRESSURE: 84 MMHG | WEIGHT: 189 LBS | HEART RATE: 72 BPM | BODY MASS INDEX: 31.49 KG/M2 | TEMPERATURE: 98.4 F | SYSTOLIC BLOOD PRESSURE: 120 MMHG | RESPIRATION RATE: 16 BRPM

## 2018-08-02 DIAGNOSIS — E78.5 HYPERLIPIDEMIA, UNSPECIFIED HYPERLIPIDEMIA TYPE: ICD-10-CM

## 2018-08-02 DIAGNOSIS — I10 ESSENTIAL HYPERTENSION: ICD-10-CM

## 2018-08-02 DIAGNOSIS — Z00.00 WELL WOMAN EXAM WITHOUT GYNECOLOGICAL EXAM: Primary | ICD-10-CM

## 2018-08-02 DIAGNOSIS — H53.9 VISUAL DISTURBANCE: ICD-10-CM

## 2018-08-02 DIAGNOSIS — Z82.49 FAMILY HISTORY OF ASCVD (ARTERIOSCLEROTIC CARDIOVASCULAR DISEASE): ICD-10-CM

## 2018-08-02 PROCEDURE — 99396 PREV VISIT EST AGE 40-64: CPT | Performed by: FAMILY MEDICINE

## 2018-08-02 RX ORDER — LISINOPRIL AND HYDROCHLOROTHIAZIDE 12.5; 1 MG/1; MG/1
1 TABLET ORAL DAILY
COMMUNITY
Start: 2014-03-13 | End: 2018-09-14 | Stop reason: SDUPTHER

## 2018-08-02 RX ORDER — CHLORAL HYDRATE 500 MG
1 CAPSULE ORAL DAILY
COMMUNITY

## 2018-08-02 RX ORDER — PRAVASTATIN SODIUM 20 MG
1 TABLET ORAL DAILY
COMMUNITY
Start: 2013-03-03 | End: 2018-09-14 | Stop reason: SDUPTHER

## 2018-08-02 NOTE — PROGRESS NOTES
FAMILY PRACTICE OFFICE VISIT       NAME: Yoli Pritchard  AGE: 61 y o  SEX: female       : 1960        MRN: [id number]        Assessment and Plan     Problem List Items Addressed This Visit     Hypertension (Chronic)    Relevant Medications    lisinopril-hydrochlorothiazide (PRINZIDE,ZESTORETIC) 10-12 5 MG per tablet    Other Relevant Orders    CBC    Comprehensive metabolic panel    Hemoglobin A1C    Lipid panel    TSH, 3rd generation    Echo complete with contrast if indicated    Stress test only, exercise    Hyperlipidemia (Chronic)    Relevant Medications    pravastatin (PRAVACHOL) 20 mg tablet    Other Relevant Orders    CBC    Comprehensive metabolic panel    Hemoglobin A1C    Lipid panel    TSH, 3rd generation    Echo complete with contrast if indicated    Stress test only, exercise      Other Visit Diagnoses     Well woman exam without gynecological exam    -  Primary    Visual disturbance        Relevant Orders    Ambulatory Referral to Ophthalmology    Family history of ASCVD (arteriosclerotic cardiovascular disease)        Relevant Orders    Echo complete with contrast if indicated    Stress test only, exercise       Annual well exam   We discussed lifestyle changes, healthy diet, importance of aerobic exercise  Patient is motivated to start with lifestyle changes  Hypertension:  Well controlled, continue lisinopril HCTZ  Hyperlipidemia:  Well controlled, continue pravastatin  Due to multiple risk factors including age, menopause, hypertension, hyperlipidemia and strong family history of CAD will proceed with echo and regular stress test   I strongly advised patient to proceed with colonoscopy  She is following up with gyn on an annual basis and is up-to-date with mammography    Patient is scheduled for annual dermatology exam, multiple pigmented moles on exam   Referral to Ophthalmology for routine care  Follow-up annually and as needed  There are no Patient Instructions on file for this visit         Chief Complaint     Chief Complaint   Patient presents with    Physical Exam     Patient is here for a yearly physical        History of Present Illness     Patient presents for annual well exam   UTD with mammo  4/2018    GYN-  Mert Gomez   Last colonoscopy  2009  - due in 2014 - past due    DR Alicia Donis   pending   Dermatology  Evaluation   She remains on medications for hypertension hyperlipidemia  Results of recent blood work discussed with patient  Normal CBC, CMP, TSH, lipid panel and hemoglobin A1c  Patient feels well  She denies chest pain, palpitations, shortness of breath or dizziness  No abdominal pain or dyspepsia  No dysuria  She sleeps well  Patient is well aware about lifestyle changes that she needs to make in an effort to lose weight  She has not been exercising regularly  Patient has strong family history of cardiovascular disease            Review of Systems   Review of Systems   Constitutional: Negative  HENT: Negative  Eyes: Negative  Respiratory: Negative  Cardiovascular: Negative  Gastrointestinal: Negative  Endocrine: Negative  Genitourinary: Negative  Musculoskeletal: Negative  Skin: Negative  Allergic/Immunologic: Negative  Neurological: Negative  Hematological: Negative  Psychiatric/Behavioral: Negative  Active Problem List     Patient Active Problem List   Diagnosis    Hypertension    Hyperlipidemia       Past Medical History:  Past Medical History:   Diagnosis Date    Anemia     Hypercholesterolemia     Hypertension     [place] Rosella Goldmann Rosella Goldmann 7/28/17        Past Surgical History:  Past Surgical History:   Procedure Laterality Date    COLONOSCOPY      12/9 done Dr Alicia Donis / Macel Speed Rosella Goldmann Rosella Goldmann 7/29/16    EXPLORATORY LAPAROTOMY      Ovarian cystectomy / 1986       Family History:  Family History   Problem Relation Age of Onset    Colon cancer Mother        Social History:  Social History     Social History    Marital status: /Civil Union     Spouse name: N/A    Number of children: N/A    Years of education: N/A     Occupational History    Not on file  Social History Main Topics    Smoking status: Never Smoker    Smokeless tobacco: Never Used    Alcohol use Not on file    Drug use: Unknown    Sexual activity: Not on file     Other Topics Concern    Not on file     Social History Narrative    No narrative on file       Objective     Vitals:    08/02/18 1607   BP: 120/84   Pulse:    Resp:    Temp:      Wt Readings from Last 3 Encounters:   08/02/18 85 7 kg (189 lb)   12/12/17 88 6 kg (195 lb 4 oz)   08/15/17 86 2 kg (190 lb)       Physical Exam   Constitutional: She is oriented to person, place, and time  She appears well-developed and well-nourished  HENT:   Head: Normocephalic and atraumatic  Eyes: Conjunctivae are normal  Pupils are equal, round, and reactive to light  Neck: Neck supple  Carotid bruit is not present  No thyromegaly present  Cardiovascular: Normal rate, regular rhythm and normal heart sounds  No murmur heard  Pulmonary/Chest: Effort normal and breath sounds normal  No respiratory distress  She has no wheezes  She has no rales  Abdominal: Soft  Normal appearance and bowel sounds are normal  She exhibits no distension and no abdominal bruit  There is no tenderness  Musculoskeletal: Normal range of motion  She exhibits no edema or deformity  Lymphadenopathy:     She has no cervical adenopathy  Neurological: She is alert and oriented to person, place, and time  No cranial nerve deficit  Skin:   Multiple pigmented moles  Irregular shaped brown mole left anterior chest wall approximately 6-7 mm   Psychiatric: She has a normal mood and affect  Her behavior is normal    Nursing note and vitals reviewed        Pertinent Laboratory/Diagnostic Studies:  Lab Results   Component Value Date    GLUCOSE 92 07/29/2016    BUN 13 07/25/2018    CREATININE 0 94 07/25/2018    CALCIUM 9 1 07/25/2018     07/25/2018    K 4 1 07/25/2018    CO2 30 07/25/2018     07/25/2018     Lab Results   Component Value Date    ALT 37 07/25/2018    AST 18 07/25/2018    ALKPHOS 76 07/25/2018    BILITOT 0 44 07/25/2018       Lab Results   Component Value Date    WBC 5 15 07/25/2018    HGB 14 0 07/25/2018    HCT 42 0 07/25/2018    MCV 87 07/25/2018     07/25/2018       No results found for: TSH    Lab Results   Component Value Date    CHOL 179 07/25/2018    CHOL 176 07/25/2018     Lab Results   Component Value Date    TRIG 73 07/25/2018    TRIG 74 07/25/2018     Lab Results   Component Value Date    HDL 54 07/25/2018    HDL 59 07/25/2018     Lab Results   Component Value Date    LDLCALC 110 (H) 07/25/2018    LDLCALC 102 (H) 07/25/2018     Lab Results   Component Value Date    HGBA1C 5 5 07/25/2018       Results for orders placed or performed in visit on 07/25/18   Lipid panel   Result Value Ref Range    Cholesterol 176 50 - 200 mg/dL    Triglycerides 74 <=150 mg/dL    HDL, Direct 59 40 - 60 mg/dL    LDL Calculated 102 (H) 0 - 100 mg/dL    Non-HDL-Chol (CHOL-HDL) 117 mg/dl   Hemoglobin A1C   Result Value Ref Range    Hemoglobin A1C 5 5 4 2 - 6 3 %     mg/dl       Orders Placed This Encounter   Procedures    CBC    Comprehensive metabolic panel    Hemoglobin A1C    Lipid panel    TSH, 3rd generation    Ambulatory Referral to Ophthalmology    Stress test only, exercise    Echo complete with contrast if indicated       ALLERGIES:  No Known Allergies    Current Medications     Current Outpatient Prescriptions   Medication Sig Dispense Refill    lisinopril-hydrochlorothiazide (PRINZIDE,ZESTORETIC) 10-12 5 MG per tablet Take 1 tablet by mouth daily      pravastatin (PRAVACHOL) 20 mg tablet Take 1 tablet by mouth daily      aspirin 81 MG tablet Take 1 tablet by mouth daily      Calcium Carbonate-Vitamin D (CALCIUM 600+D) 600-400 MG-UNIT per tablet Take 1 tablet by mouth daily      Omega-3 Fatty Acids (FISH OIL) 1,000 mg Take 1 tablet by mouth daily       No current facility-administered medications for this visit            Health Maintenance     Health Maintenance   Topic Date Due    HIV SCREENING  1960    Hepatitis C Screening  1960    Depression Screening PHQ-9  1960    CRC Screening: Colonoscopy  12/18/2014    DTaP,Tdap,and Td Vaccines (1 - Tdap) 08/02/2019 (Originally 8/29/1980)    INFLUENZA VACCINE  09/01/2018     Immunization History   Administered Date(s) Administered    Influenza Quadrivalent Preservative Free 3 years and older IM 11/08/2016    Influenza TIV (IM) 09/02/2015       Annita Alex MD

## 2018-08-20 ENCOUNTER — ANNUAL EXAM (OUTPATIENT)
Dept: OBGYN CLINIC | Facility: CLINIC | Age: 58
End: 2018-08-20
Payer: COMMERCIAL

## 2018-08-20 VITALS
HEIGHT: 65 IN | BODY MASS INDEX: 31.75 KG/M2 | DIASTOLIC BLOOD PRESSURE: 84 MMHG | WEIGHT: 190.6 LBS | SYSTOLIC BLOOD PRESSURE: 122 MMHG

## 2018-08-20 DIAGNOSIS — Z12.39 BREAST CANCER SCREENING: ICD-10-CM

## 2018-08-20 DIAGNOSIS — Z01.419 ENCOUNTER FOR ANNUAL ROUTINE GYNECOLOGICAL EXAMINATION: Primary | ICD-10-CM

## 2018-08-20 PROCEDURE — G0145 SCR C/V CYTO,THINLAYER,RESCR: HCPCS | Performed by: OBSTETRICS & GYNECOLOGY

## 2018-08-20 PROCEDURE — 87624 HPV HI-RISK TYP POOLED RSLT: CPT | Performed by: OBSTETRICS & GYNECOLOGY

## 2018-08-20 PROCEDURE — 99396 PREV VISIT EST AGE 40-64: CPT | Performed by: OBSTETRICS & GYNECOLOGY

## 2018-08-20 NOTE — PROGRESS NOTES
Assessment/Plan:    Pap smear done as well as annual   Encouraged self-breast examination as well as calcium supplementation  Continue annual mammogram, discussed 3D mammography  She will check see if this is covered by her insurance  She is due for screening colonoscopy  She will continue to follow-up with her family physician as scheduled  Return to office in 1 year  No problem-specific Assessment & Plan notes found for this encounter  Diagnoses and all orders for this visit:    Encounter for annual routine gynecological examination  -     Liquid-based pap, screening    Breast cancer screening  -     Mammo screening bilateral w 3d & cad; Future          Subjective:      Patient ID: Imelda Franks is a 61 y o  female  HPI     This is a 41-year-old female  ( x3) presents for her annual gyn exam   Patient went through menopause at age 45  She has never been on hormone replacement therapy  She denies any vaginal bleeding or spotting  She denies any changes in bowel or bladder function  She has had a long history of symptomatic ovarian cysts which have resulted in cystectomy  She underwent a pelvic ultrasound in May which had revealed stable 1 cm ovarian cysts, stable 3 5 fibroid uterus  Patient is asymptomatic  She continues to follow-up with her family physician for hypertension and hypercholesterolemia which is stable  She underwent her mammogram in April  She is due for her screening colonoscopy, Q 5 years due to family history of colon cancer  The following portions of the patient's history were reviewed and updated as appropriate: allergies, current medications, past family history, past medical history, past social history, past surgical history and problem list     Review of Systems   Constitutional: Negative for fatigue, fever and unexpected weight change  Respiratory: Negative for cough, chest tightness, shortness of breath and wheezing  Cardiovascular: Negative  Negative for chest pain and palpitations  Gastrointestinal: Negative  Negative for abdominal distention, abdominal pain, blood in stool, constipation, diarrhea, nausea and vomiting  Genitourinary: Negative  Negative for difficulty urinating, dyspareunia, dysuria, flank pain, frequency, genital sores, hematuria, pelvic pain, urgency, vaginal bleeding, vaginal discharge and vaginal pain  Skin: Negative for rash  Objective:      /84   Ht 5' 5" (1 651 m)   Wt 86 5 kg (190 lb 9 6 oz)   Breastfeeding? No   BMI 31 72 kg/m²          Physical Exam   Constitutional: She appears well-developed and well-nourished  Cardiovascular: Normal rate and regular rhythm  Pulmonary/Chest: Effort normal and breath sounds normal  Right breast exhibits no inverted nipple, no mass, no nipple discharge, no skin change and no tenderness  Left breast exhibits no inverted nipple, no mass, no nipple discharge, no skin change and no tenderness  Abdominal: Soft  Bowel sounds are normal  She exhibits no distension  There is no tenderness  There is no rebound and no guarding  Genitourinary: Rectum normal, vagina normal and uterus normal  There is no lesion on the right labia  There is no lesion on the left labia  Cervix exhibits no discharge and no friability  Right adnexum displays no mass, no tenderness and no fullness  Left adnexum displays no mass, no tenderness and no fullness  No vaginal discharge found

## 2018-08-23 LAB
HPV HR 12 DNA CVX QL NAA+PROBE: NEGATIVE
HPV16 DNA CVX QL NAA+PROBE: NEGATIVE
HPV18 DNA CVX QL NAA+PROBE: NEGATIVE
LAB AP GYN PRIMARY INTERPRETATION: NORMAL
Lab: NORMAL

## 2018-09-04 ENCOUNTER — LAB REQUISITION (OUTPATIENT)
Dept: LAB | Facility: HOSPITAL | Age: 58
End: 2018-09-04
Payer: COMMERCIAL

## 2018-09-04 DIAGNOSIS — D22.5 MELANOCYTIC NEVI OF TRUNK: ICD-10-CM

## 2018-09-04 PROCEDURE — 88341 IMHCHEM/IMCYTCHM EA ADD ANTB: CPT | Performed by: PATHOLOGY

## 2018-09-04 PROCEDURE — 88305 TISSUE EXAM BY PATHOLOGIST: CPT | Performed by: PATHOLOGY

## 2018-09-04 PROCEDURE — 88342 IMHCHEM/IMCYTCHM 1ST ANTB: CPT | Performed by: PATHOLOGY

## 2018-09-14 DIAGNOSIS — E78.5 HYPERLIPIDEMIA, UNSPECIFIED HYPERLIPIDEMIA TYPE: ICD-10-CM

## 2018-09-14 DIAGNOSIS — I10 ESSENTIAL HYPERTENSION: Primary | ICD-10-CM

## 2018-09-14 RX ORDER — PRAVASTATIN SODIUM 20 MG
TABLET ORAL
Qty: 90 TABLET | Refills: 3 | Status: SHIPPED | OUTPATIENT
Start: 2018-09-14 | End: 2020-12-11

## 2018-09-14 RX ORDER — LISINOPRIL AND HYDROCHLOROTHIAZIDE 12.5; 1 MG/1; MG/1
TABLET ORAL
Qty: 90 TABLET | Refills: 3 | Status: SHIPPED | OUTPATIENT
Start: 2018-09-14 | End: 2019-09-16 | Stop reason: SDUPTHER

## 2018-11-12 ENCOUNTER — OFFICE VISIT (OUTPATIENT)
Dept: FAMILY MEDICINE CLINIC | Facility: CLINIC | Age: 58
End: 2018-11-12
Payer: COMMERCIAL

## 2018-11-12 VITALS
WEIGHT: 189 LBS | BODY MASS INDEX: 31.49 KG/M2 | HEART RATE: 72 BPM | SYSTOLIC BLOOD PRESSURE: 118 MMHG | HEIGHT: 65 IN | RESPIRATION RATE: 16 BRPM | TEMPERATURE: 100.2 F | DIASTOLIC BLOOD PRESSURE: 80 MMHG

## 2018-11-12 DIAGNOSIS — R10.30 LOWER ABDOMINAL PAIN: Primary | ICD-10-CM

## 2018-11-12 LAB
SL AMB  POCT GLUCOSE, UA: NORMAL
SL AMB LEUKOCYTE ESTERASE,UA: NORMAL
SL AMB POCT BILIRUBIN,UA: NORMAL
SL AMB POCT BLOOD,UA: NORMAL
SL AMB POCT CLARITY,UA: CLEAR
SL AMB POCT COLOR,UA: YELLOW
SL AMB POCT KETONES,UA: 80
SL AMB POCT NITRITE,UA: NORMAL
SL AMB POCT PH,UA: 5
SL AMB POCT SPECIFIC GRAVITY,UA: 1.01
SL AMB POCT URINE PROTEIN: 15
SL AMB POCT UROBILINOGEN: 0.2

## 2018-11-12 PROCEDURE — 1036F TOBACCO NON-USER: CPT | Performed by: FAMILY MEDICINE

## 2018-11-12 PROCEDURE — 87086 URINE CULTURE/COLONY COUNT: CPT | Performed by: FAMILY MEDICINE

## 2018-11-12 PROCEDURE — 81003 URINALYSIS AUTO W/O SCOPE: CPT | Performed by: FAMILY MEDICINE

## 2018-11-12 PROCEDURE — 3008F BODY MASS INDEX DOCD: CPT | Performed by: FAMILY MEDICINE

## 2018-11-12 PROCEDURE — 99213 OFFICE O/P EST LOW 20 MIN: CPT | Performed by: FAMILY MEDICINE

## 2018-11-12 RX ORDER — CIPROFLOXACIN 500 MG/1
500 TABLET, FILM COATED ORAL 2 TIMES DAILY
Qty: 14 TABLET | Refills: 0 | Status: SHIPPED | OUTPATIENT
Start: 2018-11-12 | End: 2018-11-19

## 2018-11-12 NOTE — PROGRESS NOTES
FAMILY PRACTICE OFFICE VISIT       NAME: Kate Pemberton  AGE: 60 y o  SEX: female       : 1960        MRN: [id number]        Assessment and Plan     Problem List Items Addressed This Visit     None      Visit Diagnoses     Lower abdominal pain    -  Primary    Relevant Medications    ciprofloxacin (CIPRO) 500 mg tablet    Other Relevant Orders    POCT urine dip auto non-scope (Completed)    Urine culture        Patient presents for evaluation of suprapubic abdominal discomfort for 1 week  Low-grade fever today  She denies symptoms of nausea, vomiting, diarrhea or appetite changes  Urinalysis reveals leukocytes  Concerned about urinary tract infection versus? Diverticulitis versus? Appendicitis,    doubt due to duration of symptoms, normal appetite and no other GI complaints  Will hold off imaging tonight  I strongly advised patient to start Cipro 500 mg twice a day for 7 days  She will force fluids including water and cranberry juice  Patient may use Tylenol as needed for low-grade fever  Advised rest   She will contact me tomorrow if her symptoms are not improving significantly-will consider CT abdomen and pelvis then  Urine culture is pending  Patient understands instructions and agrees  There are no Patient Instructions on file for this visit  M*Modal software was used to dictate this note  It may contain errors with dictating incorrect words/spelling  Please contact provider directly with any questions  Chief Complaint     Chief Complaint   Patient presents with    Abdominal Pain     pt is here for center abdominal 1 + wk       History of Present Illness     Lower abdominal pian for a week    Center - to the right     Some suprapubic pressure  Patient has noticed pink urine discoloration on the wipe this morning      No vaginal  Bleeding   UTD with  GYN- last office visit 2018     No constipation or diarrhea   Appetite is normal    Pain is on/off  Patient did notice bladder discomfort/pressure with urination and cloudy urine today  She denies urinary frequency or urgency  No flank pain  Low-grade fever upon arrival to the office today  Abdominal Pain   This is a new problem  The current episode started in the past 7 days  The onset quality is gradual  The problem occurs intermittently  The problem has been waxing and waning  The pain is located in the suprapubic region, RLQ and LLQ  The quality of the pain is cramping and dull  The abdominal pain does not radiate  Associated symptoms include dysuria, a fever and hematuria  Pertinent negatives include no anorexia, belching, constipation, diarrhea, frequency, melena, nausea or vomiting  She has tried nothing for the symptoms  Review of Systems   Review of Systems   Constitutional: Positive for fever  HENT: Negative  Respiratory: Negative  Cardiovascular: Negative  Gastrointestinal: Positive for abdominal pain  Negative for anorexia, constipation, diarrhea, melena, nausea and vomiting  Genitourinary: Positive for dysuria, hematuria and pelvic pain  Negative for frequency and urgency  Neurological: Negative  Active Problem List     Patient Active Problem List   Diagnosis    Hypertension    Hyperlipidemia       Past Medical History:  Past Medical History:   Diagnosis Date    Anemia     Hypercholesterolemia     Hypertension     [place]  Sumeet Kirkland 7/28/17        Past Surgical History:  Past Surgical History:   Procedure Laterality Date    COLONOSCOPY      12/9 done Dr Nazario Owens / Migue Kirkland 7/29/16    DIAGNOSTIC LAPAROSCOPY      Ovarian cystectomy    EXPLORATORY LAPAROTOMY  1986    Ovarian cystectomy / 1986    LAPAROSCOPY      Ovarian cystectomy       Family History:  Family History   Problem Relation Age of Onset    Colon cancer Mother     Breast cancer Mother        Social History:  Social History     Social History    Marital status: /Civil Union     Spouse name: N/A    Number of children: N/A  Years of education: N/A     Occupational History    Not on file  Social History Main Topics    Smoking status: Never Smoker    Smokeless tobacco: Never Used    Alcohol use No    Drug use: No    Sexual activity: Not on file     Other Topics Concern    Not on file     Social History Narrative    No narrative on file         Objective     Vitals:    11/12/18 1551   BP: 118/80   Pulse: 72   Resp: 16   Temp: 100 2 °F (37 9 °C)   TempSrc: Tympanic   Weight: 85 7 kg (189 lb)   Height: 5' 5" (1 651 m)     Wt Readings from Last 3 Encounters:   11/12/18 85 7 kg (189 lb)   08/20/18 86 5 kg (190 lb 9 6 oz)   08/02/18 85 7 kg (189 lb)       Physical Exam   Constitutional: She is oriented to person, place, and time  She appears well-developed and well-nourished  HENT:   Head: Normocephalic and atraumatic  Eyes: Conjunctivae are normal    Neck: Neck supple  Carotid bruit is not present  No thyromegaly present  Cardiovascular: Normal rate, regular rhythm and normal heart sounds  No murmur heard  Pulmonary/Chest: Effort normal and breath sounds normal  No respiratory distress  She has no wheezes  Abdominal: Soft  Normal appearance, normal aorta and bowel sounds are normal  She exhibits no distension and no abdominal bruit  There is tenderness in the right lower quadrant, suprapubic area and left lower quadrant  There is rebound (Mild rebound tenderness with suprapubic palpation)  There is no rigidity, no guarding and no CVA tenderness  Musculoskeletal: Normal range of motion  She exhibits no edema  Neurological: She is alert and oriented to person, place, and time  No cranial nerve deficit  Coordination normal    Psychiatric: She has a normal mood and affect  Her behavior is normal    Nursing note and vitals reviewed        Pertinent Laboratory/Diagnostic Studies:  Lab Results   Component Value Date    GLUCOSE 81 07/28/2015    BUN 13 07/25/2018    CREATININE 0 94 07/25/2018    CALCIUM 9 1 07/25/2018     07/28/2015    K 4 1 07/25/2018    CO2 30 07/25/2018     07/25/2018     Lab Results   Component Value Date    ALT 37 07/25/2018    AST 18 07/25/2018    ALKPHOS 76 07/25/2018    BILITOT 0 43 07/28/2015       Lab Results   Component Value Date    WBC 5 15 07/25/2018    HGB 14 0 07/25/2018    HCT 42 0 07/25/2018    MCV 87 07/25/2018     07/25/2018       No results found for: TSH    Lab Results   Component Value Date    CHOL 196 07/28/2015     Lab Results   Component Value Date    TRIG 73 07/25/2018    TRIG 74 07/25/2018     Lab Results   Component Value Date    HDL 54 07/25/2018    HDL 59 07/25/2018     Lab Results   Component Value Date    LDLCALC 110 (H) 07/25/2018    LDLCALC 102 (H) 07/25/2018     Lab Results   Component Value Date    HGBA1C 5 5 07/25/2018       Results for orders placed or performed in visit on 11/12/18   POCT urine dip auto non-scope   Result Value Ref Range     COLOR,UA yellow     CLARITY,UA clear     SPECIFIC GRAVITY,UA 1 015      PH,UA 5 0     LEUKOCYTE ESTERASE,++     NITRITE,UA -     GLUCOSE, UA -     KETONES,UA 80     BILIRUBIN,UA -     BLOOD,UA -     POCT URINE PROTEIN 15     SL AMB POCT UROBILINOGEN 0 2        Orders Placed This Encounter   Procedures    Urine culture    POCT urine dip auto non-scope       ALLERGIES:  No Known Allergies    Current Medications     Current Outpatient Prescriptions   Medication Sig Dispense Refill    aspirin 81 MG tablet Take 1 tablet by mouth daily      Calcium Carbonate-Vitamin D (CALCIUM 600+D) 600-400 MG-UNIT per tablet Take 1 tablet by mouth daily      lisinopril-hydrochlorothiazide (PRINZIDE,ZESTORETIC) 10-12 5 MG per tablet TAKE ONE TABLET BY MOUTH EVERY DAY 90 tablet 3    Omega-3 Fatty Acids (FISH OIL) 1,000 mg Take 1 tablet by mouth daily      pravastatin (PRAVACHOL) 20 mg tablet TAKE ONE TABLET BY MOUTH EVERY DAY 90 tablet 3    ciprofloxacin (CIPRO) 500 mg tablet Take 1 tablet (500 mg total) by mouth 2 (two) times a day for 7 days 14 tablet 0     No current facility-administered medications for this visit            Health Maintenance     Health Maintenance   Topic Date Due    Pneumococcal PPSV23 Highest Risk Adult (1 of 3 - PCV13) 08/29/1978    INFLUENZA VACCINE  07/01/2018    CRC Screening: Colonoscopy  12/12/2018 (Originally 12/18/2014)    DTaP,Tdap,and Td Vaccines (1 - Tdap) 08/02/2019 (Originally 8/29/1980)    Depression Screening PHQ  08/20/2019     Immunization History   Administered Date(s) Administered    H1N1, All Formulations 12/15/2009    Influenza 11/01/2018    Influenza Quadrivalent Preservative Free 3 years and older IM 11/08/2016    Influenza TIV (IM) 09/02/2015       Severino Hanley MD

## 2018-11-13 LAB — BACTERIA UR CULT: NORMAL

## 2018-11-27 ENCOUNTER — TELEPHONE (OUTPATIENT)
Dept: FAMILY MEDICINE CLINIC | Facility: CLINIC | Age: 58
End: 2018-11-27

## 2018-11-27 NOTE — TELEPHONE ENCOUNTER
Patient called she has a couple questions about her urinalysis she had done  She is feeling fine now  She is just curious about the contaminents found in her urine  Please contact patient at [phone]

## 2018-11-28 NOTE — TELEPHONE ENCOUNTER
I spoke with patient  She is feeling perfectly fine  She denies any symptoms of abdominal pain or dysuria  Patient states that she has noticed significant improvement of her symptoms within 24 hours of antibiotic therapy which she has completed  Patient was concerned about finding of contaminated urine culture  She has no concerns at present time  She denies abdominal pain or dysuria  Will repeat UA, patient will drop off sample to the office

## 2019-08-15 ENCOUNTER — OFFICE VISIT (OUTPATIENT)
Dept: FAMILY MEDICINE CLINIC | Facility: CLINIC | Age: 59
End: 2019-08-15
Payer: COMMERCIAL

## 2019-08-15 ENCOUNTER — APPOINTMENT (OUTPATIENT)
Dept: LAB | Facility: CLINIC | Age: 59
End: 2019-08-15
Payer: COMMERCIAL

## 2019-08-15 VITALS
HEIGHT: 63 IN | DIASTOLIC BLOOD PRESSURE: 70 MMHG | WEIGHT: 193 LBS | OXYGEN SATURATION: 97 % | SYSTOLIC BLOOD PRESSURE: 120 MMHG | RESPIRATION RATE: 16 BRPM | HEART RATE: 81 BPM | TEMPERATURE: 98.2 F | BODY MASS INDEX: 34.2 KG/M2

## 2019-08-15 DIAGNOSIS — I10 ESSENTIAL HYPERTENSION: Chronic | ICD-10-CM

## 2019-08-15 DIAGNOSIS — E78.5 HYPERLIPIDEMIA, UNSPECIFIED HYPERLIPIDEMIA TYPE: Chronic | ICD-10-CM

## 2019-08-15 DIAGNOSIS — E55.9 VITAMIN D DEFICIENCY: ICD-10-CM

## 2019-08-15 DIAGNOSIS — Z00.00 WELL ADULT EXAM: ICD-10-CM

## 2019-08-15 DIAGNOSIS — Z00.00 WELL ADULT EXAM: Primary | ICD-10-CM

## 2019-08-15 DIAGNOSIS — Z11.59 NEED FOR HEPATITIS C SCREENING TEST: ICD-10-CM

## 2019-08-15 LAB
25(OH)D3 SERPL-MCNC: 23.2 NG/ML (ref 30–100)
ALBUMIN SERPL BCP-MCNC: 4 G/DL (ref 3.5–5)
ALP SERPL-CCNC: 83 U/L (ref 46–116)
ALT SERPL W P-5'-P-CCNC: 30 U/L (ref 12–78)
ANION GAP SERPL CALCULATED.3IONS-SCNC: 7 MMOL/L (ref 4–13)
AST SERPL W P-5'-P-CCNC: 16 U/L (ref 5–45)
BILIRUB SERPL-MCNC: 0.47 MG/DL (ref 0.2–1)
BUN SERPL-MCNC: 13 MG/DL (ref 5–25)
CALCIUM SERPL-MCNC: 9.3 MG/DL (ref 8.3–10.1)
CHLORIDE SERPL-SCNC: 105 MMOL/L (ref 100–108)
CHOLEST SERPL-MCNC: 194 MG/DL (ref 50–200)
CO2 SERPL-SCNC: 28 MMOL/L (ref 21–32)
CREAT SERPL-MCNC: 0.94 MG/DL (ref 0.6–1.3)
ERYTHROCYTE [DISTWIDTH] IN BLOOD BY AUTOMATED COUNT: 12.7 % (ref 11.6–15.1)
EST. AVERAGE GLUCOSE BLD GHB EST-MCNC: 111 MG/DL
GFR SERPL CREATININE-BSD FRML MDRD: 67 ML/MIN/1.73SQ M
GLUCOSE P FAST SERPL-MCNC: 95 MG/DL (ref 65–99)
HBA1C MFR BLD: 5.5 % (ref 4.2–6.3)
HCT VFR BLD AUTO: 42.2 % (ref 34.8–46.1)
HCV AB SER QL: NORMAL
HDLC SERPL-MCNC: 53 MG/DL (ref 40–60)
HGB BLD-MCNC: 13.8 G/DL (ref 11.5–15.4)
LDLC SERPL CALC-MCNC: 114 MG/DL (ref 0–100)
MCH RBC QN AUTO: 28.6 PG (ref 26.8–34.3)
MCHC RBC AUTO-ENTMCNC: 32.7 G/DL (ref 31.4–37.4)
MCV RBC AUTO: 87 FL (ref 82–98)
PLATELET # BLD AUTO: 249 THOUSANDS/UL (ref 149–390)
PMV BLD AUTO: 10.5 FL (ref 8.9–12.7)
POTASSIUM SERPL-SCNC: 3.8 MMOL/L (ref 3.5–5.3)
PROT SERPL-MCNC: 7.7 G/DL (ref 6.4–8.2)
RBC # BLD AUTO: 4.83 MILLION/UL (ref 3.81–5.12)
SODIUM SERPL-SCNC: 140 MMOL/L (ref 136–145)
TRIGL SERPL-MCNC: 136 MG/DL
TSH SERPL DL<=0.05 MIU/L-ACNC: 2.13 UIU/ML (ref 0.36–3.74)
WBC # BLD AUTO: 5.56 THOUSAND/UL (ref 4.31–10.16)

## 2019-08-15 PROCEDURE — 80061 LIPID PANEL: CPT

## 2019-08-15 PROCEDURE — 84443 ASSAY THYROID STIM HORMONE: CPT

## 2019-08-15 PROCEDURE — 36415 COLL VENOUS BLD VENIPUNCTURE: CPT

## 2019-08-15 PROCEDURE — 80053 COMPREHEN METABOLIC PANEL: CPT

## 2019-08-15 PROCEDURE — 86803 HEPATITIS C AB TEST: CPT

## 2019-08-15 PROCEDURE — 82306 VITAMIN D 25 HYDROXY: CPT

## 2019-08-15 PROCEDURE — 83036 HEMOGLOBIN GLYCOSYLATED A1C: CPT

## 2019-08-15 PROCEDURE — 85027 COMPLETE CBC AUTOMATED: CPT

## 2019-08-15 PROCEDURE — 99396 PREV VISIT EST AGE 40-64: CPT | Performed by: FAMILY MEDICINE

## 2019-08-15 NOTE — PROGRESS NOTES
FAMILY PRACTICE OFFICE VISIT       NAME: Daily Mccallum  AGE: 60 y o  SEX: female       : 1960        MRN: [id number]        Assessment and Plan     Problem List Items Addressed This Visit        Cardiovascular and Mediastinum    Hypertension (Chronic)     · Importance of weight loss, exercise, low-sodium diet emphasized  · Blood pressure is normal on my recheck, continue lisinopril HCTZ 10/12 5 mg once a day            Other    Hyperlipidemia (Chronic)     · Continue pravastatin 20 mg daily  · Pending blood work including lipid panel and LFTs         Relevant Orders    CBC (Completed)    Comprehensive metabolic panel (Completed)    Lipid Panel with Direct LDL reflex (Completed)    TSH, 3rd generation (Completed)    Hemoglobin A1C (Completed)      Other Visit Diagnoses     Well adult exam    -  Primary    Relevant Orders    CBC (Completed)    Comprehensive metabolic panel (Completed)    Lipid Panel with Direct LDL reflex (Completed)    TSH, 3rd generation (Completed)    Vitamin D deficiency        Relevant Orders    Vitamin D 25 hydroxy (Completed)    Need for hepatitis C screening test        Relevant Orders    Hepatitis C antibody (Completed)       Annual well exam  Assessment and plan as outlined above  Patient will be proceeding with blood work today  Pending colonoscopy and mammogram  She is up-to-date with regular gyn exams  Follow up pending labs, updates, annually and as needed  BMI Counseling: Body mass index is 33 77 kg/m²  Discussed the patient's BMI with her  The BMI is above average  BMI counseling and education was provided to the patient   Nutrition recommendations include reducing portion sizes, decreasing overall calorie intake, 3-5 servings of fruits/vegetables daily, reducing fast food intake, consuming healthier snacks, decreasing soda and/or juice intake, moderation in carbohydrate intake, increasing intake of lean protein, reducing intake of saturated fat and trans fat and reducing intake of cholesterol  Exercise recommendations include moderate aerobic physical activity for 150 minutes/week and exercising 3-5 times per week  There are no Patient Instructions on file for this visit  Discussed with the patient and all questioned fully answered  She will call me if any problems arise  M*Bimbasket software was used to dictate this note  It may contain errors with dictating incorrect words/spelling  Please contact provider directly with any questions  Chief Complaint     Chief Complaint   Patient presents with    Well Check       History of Present Illness     Patient presents for annual well exam   She remains on medications for hypertension hyperlipidemia  Patient denies symptoms of chest pain, palpitations, shortness of breath or dizziness  Patient is past due colonoscopy and will be scheduling colonoscopy with Dr Thomas  mammo-  Scheduled  9/2019  Pending  F/up with Josephine Reeder - yearly  Skin exam  Pending stress test and ECHO-  Scheduled/ pt asymptomatic with strong FHx of CAD and risk factors including menopause, hypertension, hyperlipidemia and obesity   stable weight - gained 3 lbs since last office visit   Works,babysits grandchildren   Busy active lifestyle but no routine physical exercise  Patient admits to occasional dietary indiscretions             Review of Systems   Review of Systems   Constitutional: Negative  HENT: Negative  Eyes: Negative  Respiratory: Negative  Cardiovascular: Negative  Gastrointestinal: Negative  Endocrine: Negative  Genitourinary: Negative  Musculoskeletal: Negative  Skin: Negative  Allergic/Immunologic: Negative  Neurological: Negative  Hematological: Negative  Psychiatric/Behavioral: Negative          Active Problem List     Patient Active Problem List   Diagnosis    Hypertension    Hyperlipidemia       Past Medical History:  Past Medical History:   Diagnosis Date    Anemia     Hypercholesterolemia     [place]     [place]  [facility]        Past Surgical History:  Past Surgical History:   Procedure Laterality Date    COLONOSCOPY      12/9 done Dr Yuliana Pinto / [facility]  [facility] 7/29/16    DIAGNOSTIC LAPAROSCOPY      Ovarian cystectomy    EXPLORATORY LAPAROTOMY  1986    Ovarian cystectomy / 1986    LAPAROSCOPY      Ovarian cystectomy       Family History:  Family History   Problem Relation Age of Onset    Colon cancer Mother     Breast cancer Mother        Social History:  Social History     Socioeconomic History    Marital status: /Civil Union     Spouse name: Not on file    Number of children: Not on file    Years of education: Not on file    Highest education level: Not on file   Occupational History    Not on file   Social Needs    Financial resource strain: Not on file    Food insecurity:     Worry: Not on file     Inability: Not on file    Transportation needs:     Medical: Not on file     Non-medical: Not on file   Tobacco Use    Smoking status: Never Smoker    Smokeless tobacco: Never Used   Substance and Sexual Activity    Alcohol use: No    Drug use: No    Sexual activity: Not on file   Lifestyle    Physical activity:     Days per week: Not on file     Minutes per session: Not on file    Stress: Not on file   Relationships    Social connections:     Talks on phone: Not on file     Gets together: Not on file     Attends Alevism service: Not on file     Active member of club or organization: Not on file     Attends meetings of clubs or organizations: Not on file     Relationship status: Not on file    Intimate partner violence:     Fear of current or ex partner: Not on file     Emotionally abused: Not on file     Physically abused: Not on file     Forced sexual activity: Not on file   Other Topics Concern    Not on file   Social History Narrative    Not on file     Objective     Vitals:    08/15/19 0737 08/15/19 0816   BP: 140/94 120/70   BP Location: Left arm Patient Position: Sitting    Cuff Size: Adult    Pulse: 81    Resp: 16    Temp: 98 2 °F (36 8 °C)    TempSrc: Tympanic    SpO2: 97%    Weight: 87 5 kg (193 lb)    Height: 5' 3 39" (1 61 m)      Wt Readings from Last 3 Encounters:   08/15/19 87 5 kg (193 lb)   11/12/18 85 7 kg (189 lb)   08/20/18 86 5 kg (190 lb 9 6 oz)       Physical Exam   Constitutional: She is oriented to person, place, and time  She appears well-developed and well-nourished  HENT:   Head: Normocephalic and atraumatic  Eyes: Conjunctivae are normal    Neck: Neck supple  No JVD present  Carotid bruit is not present  No thyromegaly present  Cardiovascular: Normal rate, regular rhythm, normal heart sounds and intact distal pulses  No murmur heard  Pulmonary/Chest: Effort normal and breath sounds normal  No respiratory distress  She has no wheezes  Abdominal: Soft  Bowel sounds are normal  She exhibits no distension and no abdominal bruit  There is no tenderness  There is no guarding  No hernia  Musculoskeletal: Normal range of motion  She exhibits no edema  Neurological: She is alert and oriented to person, place, and time  No cranial nerve deficit  Coordination normal    Skin: Skin is warm  No rash noted  Psychiatric: She has a normal mood and affect  Her behavior is normal    Nursing note and vitals reviewed        Pertinent Laboratory/Diagnostic Studies:  Lab Results   Component Value Date    GLUCOSE 81 07/28/2015    BUN 13 08/15/2019    CREATININE 0 94 08/15/2019    CALCIUM 9 3 08/15/2019     07/28/2015    K 3 8 08/15/2019    CO2 28 08/15/2019     08/15/2019     Lab Results   Component Value Date    ALT 30 08/15/2019    AST 16 08/15/2019    ALKPHOS 83 08/15/2019    BILITOT 0 43 07/28/2015       Lab Results   Component Value Date    WBC 5 56 08/15/2019    HGB 13 8 08/15/2019    HCT 42 2 08/15/2019    MCV 87 08/15/2019     08/15/2019       No results found for: TSH    Lab Results   Component Value Date    CHOL 196 07/28/2015     Lab Results   Component Value Date    TRIG 136 08/15/2019     Lab Results   Component Value Date    HDL 53 08/15/2019     Lab Results   Component Value Date    LDLCALC 114 (H) 08/15/2019     Lab Results   Component Value Date    HGBA1C 5 5 08/15/2019       Results for orders placed or performed in visit on 11/12/18   Urine culture   Result Value Ref Range    Urine Culture 80,000-89,000 cfu/ml     POCT urine dip auto non-scope   Result Value Ref Range     COLOR,UA yellow     CLARITY,UA clear     SPECIFIC GRAVITY,UA 1 015      PH,UA 5 0     LEUKOCYTE ESTERASE,++     NITRITE,UA -     GLUCOSE, UA -     KETONES,UA 80     BILIRUBIN,UA -     BLOOD,UA -     POCT URINE PROTEIN 15     SL AMB POCT UROBILINOGEN 0 2        Orders Placed This Encounter   Procedures    CBC    Comprehensive metabolic panel    Lipid Panel with Direct LDL reflex    TSH, 3rd generation    Hemoglobin A1C    Vitamin D 25 hydroxy    Hepatitis C antibody       ALLERGIES:  No Known Allergies    Current Medications     Current Outpatient Medications   Medication Sig Dispense Refill    aspirin 81 MG tablet Take 1 tablet by mouth daily      Calcium Carbonate-Vitamin D (CALCIUM 600+D) 600-400 MG-UNIT per tablet Take 1 tablet by mouth daily      lisinopril-hydrochlorothiazide (PRINZIDE,ZESTORETIC) 10-12 5 MG per tablet TAKE ONE TABLET BY MOUTH EVERY DAY 90 tablet 3    Omega-3 Fatty Acids (FISH OIL) 1,000 mg Take 1 tablet by mouth daily      pravastatin (PRAVACHOL) 20 mg tablet TAKE ONE TABLET BY MOUTH EVERY DAY 90 tablet 3     No current facility-administered medications for this visit  There are no discontinued medications      Health Maintenance     Health Maintenance   Topic Date Due    Pneumococcal Vaccine: Pediatrics (0 to 5 Years) and At-Risk Patients (6 to 59 Years) (1 of 3 - PCV13) 08/29/1965    BMI: Followup Plan  08/29/1977    DTaP,Tdap,and Td Vaccines (1 - Tdap) 08/29/1980    CRC Screening: Colonoscopy 12/18/2014    MAMMOGRAM  04/30/2019    INFLUENZA VACCINE  07/01/2019    Depression Screening PHQ  08/20/2019    BMI: Adult  08/15/2020    PAP SMEAR  08/20/2021    Pneumococcal Vaccine: 65+ Years (1 of 2 - PCV13) 08/29/2024    Hepatitis C Screening  Completed    HEPATITIS B VACCINES  Aged Out       Immunization History   Administered Date(s) Administered    H1N1, All Formulations 12/15/2009    INFLUENZA 11/01/2018    Influenza Quadrivalent Preservative Free 3 years and older IM 11/08/2016    Influenza TIV (IM) 09/02/2015       Cherylene Quarry, MD

## 2019-08-18 NOTE — ASSESSMENT & PLAN NOTE
· Importance of weight loss, exercise, low-sodium diet emphasized  · Blood pressure is normal on my recheck, continue lisinopril HCTZ 10/12 5 mg once a day

## 2019-09-16 ENCOUNTER — ANNUAL EXAM (OUTPATIENT)
Dept: OBGYN CLINIC | Facility: CLINIC | Age: 59
End: 2019-09-16
Payer: COMMERCIAL

## 2019-09-16 ENCOUNTER — HOSPITAL ENCOUNTER (OUTPATIENT)
Dept: RADIOLOGY | Age: 59
Discharge: HOME | End: 2019-09-16
Payer: COMMERCIAL

## 2019-09-16 VITALS
BODY MASS INDEX: 34.3 KG/M2 | WEIGHT: 193.6 LBS | SYSTOLIC BLOOD PRESSURE: 122 MMHG | HEIGHT: 63 IN | DIASTOLIC BLOOD PRESSURE: 84 MMHG

## 2019-09-16 VITALS — HEIGHT: 63 IN | WEIGHT: 193 LBS | BODY MASS INDEX: 34.2 KG/M2

## 2019-09-16 DIAGNOSIS — E78.5 HYPERLIPIDEMIA, UNSPECIFIED HYPERLIPIDEMIA TYPE: Primary | Chronic | ICD-10-CM

## 2019-09-16 DIAGNOSIS — I10 ESSENTIAL HYPERTENSION: ICD-10-CM

## 2019-09-16 DIAGNOSIS — Z01.419 ENCOUNTER FOR ANNUAL ROUTINE GYNECOLOGICAL EXAMINATION: Primary | ICD-10-CM

## 2019-09-16 DIAGNOSIS — Z12.39 BREAST CANCER SCREENING: ICD-10-CM

## 2019-09-16 PROCEDURE — 99396 PREV VISIT EST AGE 40-64: CPT | Performed by: OBSTETRICS & GYNECOLOGY

## 2019-09-16 PROCEDURE — 77063 BREAST TOMOSYNTHESIS BI: CPT

## 2019-09-16 PROCEDURE — 77067 SCR MAMMO BI INCL CAD: CPT

## 2019-09-16 NOTE — PROGRESS NOTES
Assessment/Plan:    Pap smear deferred due to low risk status  Encouraged self-breast examination as well as calcium supplementation  Continue annual mammogram   She is scheduled for her colonoscopy this month  She will continue to follow-up with her primary care physician as scheduled  Return to office in 1 year or p r n  No problem-specific Assessment & Plan notes found for this encounter  Diagnoses and all orders for this visit:    Encounter for annual routine gynecological examination    Breast cancer screening  -     Mammo screening bilateral w 3d & cad; Future          Subjective:      Patient ID: Fito Burciaga is a 60 y o  female  HPI     This is a 41-year-old female  ( x3) presents for her annual gyn exam   Patient went through menopause at age 45  She has never been on hormone replacement therapy  She denies any vaginal bleeding or spotting  She denies any hot flashes or night sweats  There has been no changes in bowel or bladder function  Patient continues to follow-up with her family physician for hypertension and hypercholesterolemia which is stable  Last colonoscopy  with follow-up this month  Patient states she is also scheduled for an echocardiogram and a stress test given strong family history of heart disease  Patient has been in a monogamous relationship with her  for over 37 years  Her Pap smears have been normal   Last Pap smear 20 18  Her gyn history significant for stable 3 5 cm fibroid uterus, 1 cm ovarian cyst   She has had several ovarian cystectomies in the -  Patient did see Dermatology in the course of the year and underwent biopsy left anterior chest wall, with benign findings      The following portions of the patient's history were reviewed and updated as appropriate: allergies, current medications, past family history, past medical history, past social history, past surgical history and problem list     Review of Systems   Constitutional: Negative for fatigue, fever and unexpected weight change  Respiratory: Negative for cough, chest tightness, shortness of breath and wheezing  Cardiovascular: Negative  Negative for chest pain and palpitations  Gastrointestinal: Negative  Negative for abdominal distention, abdominal pain, blood in stool, constipation, diarrhea, nausea and vomiting  Genitourinary: Negative  Negative for difficulty urinating, dyspareunia, dysuria, flank pain, frequency, genital sores, hematuria, pelvic pain, urgency, vaginal bleeding, vaginal discharge and vaginal pain  Skin: Negative for rash  Objective:      /84   Ht 5' 3" (1 6 m)   Wt 87 8 kg (193 lb 9 6 oz)   Breastfeeding? No   BMI 34 29 kg/m²          Physical Exam   Constitutional: She appears well-developed and well-nourished  Cardiovascular: Normal rate and regular rhythm  Pulmonary/Chest: Effort normal and breath sounds normal  Right breast exhibits no inverted nipple, no mass, no nipple discharge, no skin change and no tenderness  Left breast exhibits no inverted nipple, no mass, no nipple discharge, no skin change and no tenderness  Abdominal: Soft  Bowel sounds are normal  She exhibits no distension  There is no tenderness  There is no rebound and no guarding  Genitourinary: Vagina normal and uterus normal  There is no lesion on the right labia  There is no lesion on the left labia  Cervix exhibits no discharge and no friability  Right adnexum displays no mass, no tenderness and no fullness  Left adnexum displays no mass, no tenderness and no fullness  No vaginal discharge found  Genitourinary Comments: The vagina is evident of slight estrogen deficiency  There is no evidence of pelvic floor prolapse  Rectovaginal exam is confirmatory

## 2019-09-17 ENCOUNTER — HOSPITAL ENCOUNTER (OUTPATIENT)
Dept: NON INVASIVE DIAGNOSTICS | Facility: CLINIC | Age: 59
Discharge: HOME | End: 2019-09-17
Payer: COMMERCIAL

## 2019-09-17 ENCOUNTER — TELEPHONE (OUTPATIENT)
Dept: FAMILY MEDICINE CLINIC | Facility: CLINIC | Age: 59
End: 2019-09-17

## 2019-09-17 DIAGNOSIS — Z82.49 FAMILY HISTORY OF ASCVD (ARTERIOSCLEROTIC CARDIOVASCULAR DISEASE): ICD-10-CM

## 2019-09-17 DIAGNOSIS — E78.5 HYPERLIPIDEMIA, UNSPECIFIED HYPERLIPIDEMIA TYPE: ICD-10-CM

## 2019-09-17 DIAGNOSIS — R94.31 ABNORMAL EKG: ICD-10-CM

## 2019-09-17 DIAGNOSIS — I10 ESSENTIAL HYPERTENSION: ICD-10-CM

## 2019-09-17 DIAGNOSIS — E78.5 HYPERLIPIDEMIA, UNSPECIFIED HYPERLIPIDEMIA TYPE: Chronic | ICD-10-CM

## 2019-09-17 DIAGNOSIS — I10 ESSENTIAL HYPERTENSION: Primary | Chronic | ICD-10-CM

## 2019-09-17 PROCEDURE — 93306 TTE W/DOPPLER COMPLETE: CPT

## 2019-09-17 PROCEDURE — 93306 TTE W/DOPPLER COMPLETE: CPT | Performed by: INTERNAL MEDICINE

## 2019-09-17 RX ORDER — PRAVASTATIN SODIUM 20 MG
TABLET ORAL
Qty: 90 TABLET | Refills: 3 | Status: SHIPPED | OUTPATIENT
Start: 2019-09-17 | End: 2020-11-16 | Stop reason: SDUPTHER

## 2019-09-17 RX ORDER — LISINOPRIL AND HYDROCHLOROTHIAZIDE 12.5; 1 MG/1; MG/1
TABLET ORAL
Qty: 90 TABLET | Refills: 3 | Status: SHIPPED | OUTPATIENT
Start: 2019-09-17 | End: 2020-09-09

## 2019-09-17 NOTE — TELEPHONE ENCOUNTER
Mandi Nicolas with [facility] called regarding patient's stress test   She stated Dr David Garcia (Cardiologist) would like for her to have a nuclear stress test due to an abnormal EKG    [facility] would like a new order put in Epic for a nuclear stress test

## 2019-09-18 ENCOUNTER — TELEPHONE (OUTPATIENT)
Dept: FAMILY MEDICINE CLINIC | Facility: CLINIC | Age: 59
End: 2019-09-18

## 2019-09-18 NOTE — TELEPHONE ENCOUNTER
I called patient and left message on cell and home number to contact me back regarding results of her echocardiogram

## 2019-09-24 ENCOUNTER — HOSPITAL ENCOUNTER (OUTPATIENT)
Dept: NON INVASIVE DIAGNOSTICS | Facility: CLINIC | Age: 59
Discharge: HOME | End: 2019-09-24
Payer: COMMERCIAL

## 2019-09-24 DIAGNOSIS — Z82.49 FAMILY HISTORY OF ASCVD (ARTERIOSCLEROTIC CARDIOVASCULAR DISEASE): ICD-10-CM

## 2019-09-24 DIAGNOSIS — E78.5 HYPERLIPIDEMIA, UNSPECIFIED HYPERLIPIDEMIA TYPE: Chronic | ICD-10-CM

## 2019-09-24 DIAGNOSIS — R94.31 ABNORMAL EKG: ICD-10-CM

## 2019-09-24 DIAGNOSIS — I10 ESSENTIAL HYPERTENSION: Chronic | ICD-10-CM

## 2019-09-24 PROCEDURE — A9502 TC99M TETROFOSMIN: HCPCS

## 2019-09-24 PROCEDURE — 93017 CV STRESS TEST TRACING ONLY: CPT

## 2019-09-24 PROCEDURE — 78452 HT MUSCLE IMAGE SPECT MULT: CPT

## 2019-09-24 PROCEDURE — 78452 HT MUSCLE IMAGE SPECT MULT: CPT | Performed by: INTERNAL MEDICINE

## 2019-09-24 PROCEDURE — 93016 CV STRESS TEST SUPVJ ONLY: CPT | Performed by: INTERNAL MEDICINE

## 2019-09-24 PROCEDURE — 93018 CV STRESS TEST I&R ONLY: CPT | Performed by: INTERNAL MEDICINE

## 2019-09-24 RX ADMIN — REGADENOSON 0.4 MG: 0.08 INJECTION, SOLUTION INTRAVENOUS at 13:35

## 2019-09-25 NOTE — TELEPHONE ENCOUNTER
I spoke with patient, she is scheduled for nuclear stress test   Echocardiogram results discussed  Reportedly EKG performed during echocardiogram revealed changes consistent with left bundle branch block -new finding for patient, I explained that it could be variation of normal but possibility of occult CAD should be excluded  Patient is asymptomatic and denies chest pain or dyspnea on exertion

## 2019-09-26 LAB
CHEST PAIN STATEMENT: NORMAL
MAX DIASTOLIC BP: 86 MMHG
MAX HEART RATE: 130 BPM
MAX PREDICTED HEART RATE: 160 BPM
MAX. SYSTOLIC BP: 126 MMHG
PROTOCOL NAME: NORMAL
REASON FOR TERMINATION: NORMAL
TARGET HR FORMULA: NORMAL
TEST INDICATION: NORMAL
TIME IN EXERCISE PHASE: NORMAL

## 2019-09-27 ENCOUNTER — TELEPHONE (OUTPATIENT)
Dept: FAMILY MEDICINE CLINIC | Facility: CLINIC | Age: 59
End: 2019-09-27

## 2019-09-27 NOTE — TELEPHONE ENCOUNTER
Dr Dipesh Morris,   Pt's Nuclear Stress Test results are in pt's chart  Not sure if you had a chance to review them

## 2019-09-27 NOTE — TELEPHONE ENCOUNTER
I spoke with patient  Overall nuclear stress test was read as normal   Patient can proceed with colonoscopy on September 30th    I will clarify need for further follow-up with Cardiology, message sent

## 2019-09-27 NOTE — TELEPHONE ENCOUNTER
Pt called stating she is have a colonoscopy on Monday, 9/30/19, had a nuclear stress test on Tuesday, 9/24/19, wants to make sure there was nothing found in the test that would cause any issue with anaesthesia  Please call to advise

## 2019-09-29 ENCOUNTER — ANESTHESIA EVENT (OUTPATIENT)
Dept: GASTROENTEROLOGY | Facility: HOSPITAL | Age: 59
End: 2019-09-29

## 2019-09-29 RX ORDER — SODIUM CHLORIDE 9 MG/ML
125 INJECTION, SOLUTION INTRAVENOUS CONTINUOUS
Status: CANCELLED | OUTPATIENT
Start: 2019-09-29

## 2019-09-30 ENCOUNTER — HOSPITAL ENCOUNTER (OUTPATIENT)
Dept: GASTROENTEROLOGY | Facility: HOSPITAL | Age: 59
Setting detail: OUTPATIENT SURGERY
Discharge: HOME | End: 2019-09-30
Attending: COLON & RECTAL SURGERY
Payer: COMMERCIAL

## 2019-09-30 ENCOUNTER — ANESTHESIA (OUTPATIENT)
Dept: GASTROENTEROLOGY | Facility: HOSPITAL | Age: 59
End: 2019-09-30

## 2019-09-30 VITALS
HEIGHT: 63 IN | TEMPERATURE: 97 F | SYSTOLIC BLOOD PRESSURE: 113 MMHG | HEART RATE: 84 BPM | OXYGEN SATURATION: 96 % | RESPIRATION RATE: 16 BRPM | WEIGHT: 193 LBS | DIASTOLIC BLOOD PRESSURE: 65 MMHG | BODY MASS INDEX: 34.2 KG/M2

## 2019-09-30 DIAGNOSIS — Z80.0 FAMILY HX OF COLON CANCER REQUIRING SCREENING COLONOSCOPY: ICD-10-CM

## 2019-09-30 PROCEDURE — G0105 COLORECTAL SCRN; HI RISK IND: HCPCS | Performed by: COLON & RECTAL SURGERY

## 2019-09-30 PROCEDURE — 99213 OFFICE O/P EST LOW 20 MIN: CPT | Performed by: COLON & RECTAL SURGERY

## 2019-09-30 RX ORDER — PROPOFOL 10 MG/ML
INJECTION, EMULSION INTRAVENOUS CONTINUOUS PRN
Status: DISCONTINUED | OUTPATIENT
Start: 2019-09-30 | End: 2019-09-30 | Stop reason: SURG

## 2019-09-30 RX ORDER — PROPOFOL 10 MG/ML
INJECTION, EMULSION INTRAVENOUS AS NEEDED
Status: DISCONTINUED | OUTPATIENT
Start: 2019-09-30 | End: 2019-09-30 | Stop reason: SURG

## 2019-09-30 RX ORDER — SODIUM CHLORIDE 9 MG/ML
INJECTION, SOLUTION INTRAVENOUS CONTINUOUS PRN
Status: DISCONTINUED | OUTPATIENT
Start: 2019-09-30 | End: 2019-09-30 | Stop reason: SURG

## 2019-09-30 RX ADMIN — PROPOFOL 120 MG: 10 INJECTION, EMULSION INTRAVENOUS at 08:48

## 2019-09-30 RX ADMIN — PROPOFOL 30 MG: 10 INJECTION, EMULSION INTRAVENOUS at 08:49

## 2019-09-30 RX ADMIN — SODIUM CHLORIDE: 9 INJECTION, SOLUTION INTRAVENOUS at 08:46

## 2019-09-30 RX ADMIN — PROPOFOL 120 MCG/KG/MIN: 10 INJECTION, EMULSION INTRAVENOUS at 08:48

## 2019-09-30 RX ADMIN — PROPOFOL 30 MG: 10 INJECTION, EMULSION INTRAVENOUS at 08:50

## 2019-09-30 NOTE — ANESTHESIA PREPROCEDURE EVALUATION
Review of Systems/Medical History  Patient summary reviewed  Chart reviewed      Cardiovascular  EKG reviewed, Hyperlipidemia, Hypertension , Dysrhythmias ,   Comment: NM Stress Test (9/2019)  IMPRESSIONS: Normal study after pharmacologic vasodilation  Myocardial perfusion imaging was normal at rest and with stress  Left ventricular systolic function was normal ,  Pulmonary  Negative pulmonary ROS        GI/Hepatic  Negative GI/hepatic ROS          Negative  ROS        Endo/Other     GYN       Hematology  Negative hematology ROS      Musculoskeletal       Neurology  Negative neurology ROS      Psychology           Physical Exam    Airway    Mallampati score: I  TM Distance: >3 FB  Neck ROM: full     Dental   Comment: Crowns "all over",     Cardiovascular  Rhythm: regular, Rate: normal,     Pulmonary  Breath sounds clear to auscultation,     Other Findings        Anesthesia Plan  ASA Score- 2     Anesthesia Type- IV sedation with anesthesia with ASA Monitors  Additional Monitors:   Airway Plan:         Plan Factors-    Induction- intravenous  Postoperative Plan-     Informed Consent- Anesthetic plan and risks discussed with patient  I personally reviewed this patient with the CRNA  Discussed and agreed on the Anesthesia Plan with the CRNA  Carlos Gomez

## 2019-09-30 NOTE — ANESTHESIA POSTPROCEDURE EVALUATION
Post-Op Assessment Note    CV Status:  Stable  Pain Score: 0    Pain management: adequate     Mental Status:  Sleepy   Hydration Status:  Euvolemic   PONV Controlled:  Controlled   Airway Patency:  Patent   Post Op Vitals Reviewed: Yes      Staff: CRNA           BP 96/56 (09/30/19 0909)    Temp     Pulse 70 (09/30/19 0909)   Resp 16 (09/30/19 0909)    SpO2 95 % (09/30/19 0909)

## 2019-09-30 NOTE — H&P
History and Physical   Colon and Rectal Surgery   Carmelina Rebolledo 60 y o  female MRN: [id number]  Unit/Bed#:  Encounter: [id number]  09/30/19   @[facility]    No chief complaint on file  History of Present Illness   HPI:  Carmelina Rebolledo is a 60 y o  female who presents for colorectal cancer screening  Last colonoscopy was in 2009  No concerning symptoms  Has a family history of colorectal cancer in her mother      Historical Information   Past Medical History:   Diagnosis Date    Anemia     Hypercholesterolemia     Hypertension     [place]  [facility] 7/[place]     Left bundle branch block      Past Surgical History:   Procedure Laterality Date    COLONOSCOPY      12/9 done Dr Brooklynn Henry / [facility]  [facility] 7/29/16    DIAGNOSTIC LAPAROSCOPY      Ovarian cystectomy    EXPLORATORY LAPAROTOMY  1986    Ovarian cystectomy / 1986    LAPAROSCOPY      Ovarian cystectomy    WISDOM TOOTH EXTRACTION         Meds/Allergies       (Not in a hospital admission)      Current Outpatient Medications:     lisinopril-hydrochlorothiazide (PRINZIDE,ZESTORETIC) 10-12 5 MG per tablet, TAKE ONE TABLET BY MOUTH EVERY DAY, Disp: 90 tablet, Rfl: 3    pravastatin (PRAVACHOL) 20 mg tablet, TAKE ONE TABLET BY MOUTH EVERY DAY, Disp: 90 tablet, Rfl: 3    aspirin 81 MG tablet, Take 1 tablet by mouth daily, Disp: , Rfl:     Calcium Carbonate-Vitamin D (CALCIUM 600+D) 600-400 MG-UNIT per tablet, Take 1 tablet by mouth daily, Disp: , Rfl:     Omega-3 Fatty Acids (FISH OIL) 1,000 mg, Take 1 tablet by mouth daily, Disp: , Rfl:     pravastatin (PRAVACHOL) 20 mg tablet, TAKE ONE TABLET BY MOUTH EVERY DAY, Disp: 90 tablet, Rfl: 3    No Known Allergies      Social History   Social History     Substance and Sexual Activity   Alcohol Use No     Social History     Substance and Sexual Activity   Drug Use No     Social History     Tobacco Use   Smoking Status Never Smoker   Smokeless Tobacco Never Used         Family History:   Family History   Problem Relation Age of Onset    Colon cancer Mother 68    Breast cancer Mother 40    No Known Problems Sister     No Known Problems Daughter     Colon cancer Maternal Grandfather     Cancer Paternal Grandmother     No Known Problems Paternal Grandfather     No Known Problems Sister     No Known Problems Daughter     No Known Problems Maternal Aunt     Pancreatic cancer Paternal Aunt          Objective     Current Vitals:   Blood Pressure: 144/82 (09/30/19 0740)  Pulse: 85 (09/30/19 0740)  Temperature: (!) 97 °F (36 1 °C) (09/30/19 0745)  Temp Source: Tympanic (09/30/19 0745)  Respirations: 18 (09/30/19 0740)  Height: 5' 3" (160 cm) (09/30/19 0740)  Weight - Scale: 87 5 kg (193 lb) (09/30/19 0740)  SpO2: 96 % (09/30/19 0740)  No intake or output data in the 24 hours ending 09/30/19 0840    Physical Exam:  General:  Resting comfortably in bed   Eyes:Sclera anicteric  ENT: Trachea midline  Pulm:  Symmetric chest raise  No respiratory Distress  CV:  Regular on monitor  Abdomen:  Soft NT ND  Extremities:  No clubbing/ cyanosis/ edema    Lab Results: I have personally reviewed pertinent lab results  Imaging: I have personally reviewed pertinent reports  ASSESSMENT:  Suzanne Sánchez is a 60 y o  female who presents for outpatient colonoscopy  PLAN:  For colonoscopy    Risks/ Benefits reviewed to include but not limited to anesthesia, bleeding, missed lesions, and colonoscopic perforation requiring surgery

## 2019-10-03 DIAGNOSIS — Z82.49 FAMILY HISTORY OF ASCVD (ARTERIOSCLEROTIC CARDIOVASCULAR DISEASE): ICD-10-CM

## 2019-10-03 DIAGNOSIS — I44.7 LBBB (LEFT BUNDLE BRANCH BLOCK): Primary | ICD-10-CM

## 2019-10-03 NOTE — TELEPHONE ENCOUNTER
Please contact patient  I personally discussed results of her cardiac studies with Vernal Chasity  EKG portion described during nuclear stress test was equivocal due to baseline of left bundle branch block but overall her nuclear stress test was normal!  I believe it would be a good idea for patient to be seen by Cardiology , non urgent, to review recent testing and due to strong family history of heart disease  Dr Charbel Chanel states that she would be happy to see her  I will place orders    Thank you

## 2019-11-14 ENCOUNTER — TELEPHONE (OUTPATIENT)
Dept: CARDIOLOGY CLINIC | Facility: CLINIC | Age: 59
End: 2019-11-14

## 2019-11-20 ENCOUNTER — CONSULT (OUTPATIENT)
Dept: CARDIOLOGY CLINIC | Facility: CLINIC | Age: 59
End: 2019-11-20
Payer: COMMERCIAL

## 2019-11-20 VITALS
SYSTOLIC BLOOD PRESSURE: 138 MMHG | OXYGEN SATURATION: 97 % | WEIGHT: 192.2 LBS | DIASTOLIC BLOOD PRESSURE: 82 MMHG | BODY MASS INDEX: 34.05 KG/M2 | HEIGHT: 63 IN

## 2019-11-20 DIAGNOSIS — Z82.49 FAMILY HISTORY OF ASCVD (ARTERIOSCLEROTIC CARDIOVASCULAR DISEASE): ICD-10-CM

## 2019-11-20 DIAGNOSIS — E66.9 OBESITY (BMI 30.0-34.9): ICD-10-CM

## 2019-11-20 DIAGNOSIS — E78.5 DYSLIPIDEMIA: ICD-10-CM

## 2019-11-20 DIAGNOSIS — I44.7 LBBB (LEFT BUNDLE BRANCH BLOCK): ICD-10-CM

## 2019-11-20 DIAGNOSIS — I10 BENIGN ESSENTIAL HYPERTENSION: Primary | ICD-10-CM

## 2019-11-20 PROBLEM — E66.811 OBESITY (BMI 30.0-34.9): Status: ACTIVE | Noted: 2019-11-20

## 2019-11-20 PROCEDURE — 99244 OFF/OP CNSLTJ NEW/EST MOD 40: CPT | Performed by: INTERNAL MEDICINE

## 2019-11-20 PROCEDURE — 93000 ELECTROCARDIOGRAM COMPLETE: CPT | Performed by: INTERNAL MEDICINE

## 2019-11-20 NOTE — PROGRESS NOTES
Cardiology Consultation     Amaury Mathew  [id number]  1960  [address] [facility] [place] [address] [facility] [id number]      1  Benign essential hypertension  POCT ECG   2  Dyslipidemia     3  LBBB (left bundle branch block)  Ambulatory referral to Cardiology    POCT ECG   4  Obesity (BMI 30 0-34 9)     5  Family history of ASCVD (arteriosclerotic cardiovascular disease)  Ambulatory referral to Cardiology    POCT ECG       Discussion/Summary:  Mrs Daija Schaefer is a pleasant 39-year-old female who presents to the office today for the evaluation of an abnormal ECG  Her ECG when she presented for a stress test ordered by her primary care provider revealed a left bundle-branch block  She has already undergone appropriate testing in the form of an echocardiogram which was unremarkable  She underwent a stress test which did not reveal any evidence of ischemia or scar  She does have a longstanding history of high blood pressure  Her blood pressure control appears adequate from review of prior blood pressure readings  I did review her most recent lipids  Her LDL is suboptimal   I discuss transition to another statin  She acknowledges her lack of exercise and poor diet  She plans on initiating an exercise regimen and also making some other therapeutic lifestyle modifications with her diet  I would reassess her lipids after these changes have been attempted  If her LDL remains suboptimal I would advise changing to atorvastatin 40 mg daily  Otherwise no other cardiac testing is advised  She will follow up in the office on a yearly basis  History of Present Illness:  Mrs Daija Schaefer is a pleasant 39-year-old female who presents to the office today for the evaluation of an abnormal ECG    Given her family history her primary care provider requested an echocardiogram and stress test   When she presented for the stress test an ECG was performed and revealed a left bundle branch block  She denies any prior cardiac history  She leads a sedentary lifestyle  With the activity she is able to perform including carrying a laundry basket up a flight of steps she denies any cardiopulmonary symptoms of chest pain or shortness of breath  She denies any signs or symptoms of congestive heart failure including increasing lower extremity edema, paroxysmal nocturnal dyspnea, orthopnea, acute weight gain or increasing abdominal girth  She denies lightheadedness, syncope or presyncope  She denies palpitations or symptoms of claudication  She reports a family history of CAD  She states her father suffered a myocardial infarction at the age of 44 and  from a myocardial infarction at the age of 39  Her mother is also  and had heart attack in her mid 76s  Patient Active Problem List   Diagnosis    Benign essential hypertension    Dyslipidemia    LBBB (left bundle branch block)    Obesity (BMI 30 0-34  9)     Past Medical History:   Diagnosis Date    Anemia     Hypercholesterolemia     Hypertension     LA  Jeannetta Given Jeannetta Given 17     Hypertension     Left bundle branch block      Social History     Socioeconomic History    Marital status: /Civil Union     Spouse name: Not on file    Number of children: Not on file    Years of education: Not on file    Highest education level: Not on file   Occupational History    Not on file   Social Needs    Financial resource strain: Not on file    Food insecurity:     Worry: Not on file     Inability: Not on file    Transportation needs:     Medical: Not on file     Non-medical: Not on file   Tobacco Use    Smoking status: Never Smoker    Smokeless tobacco: Never Used   Substance and Sexual Activity    Alcohol use: Yes     Comment: social    Drug use: No    Sexual activity: Not on file   Lifestyle    Physical activity:     Days per week: Not on file     Minutes per session: Not on file    Stress: Not on file   Relationships    Social connections:     Talks on phone: Not on file     Gets together: Not on file     Attends [employer] service: Not on file     Active member of club or organization: Not on file     Attends meetings of clubs or organizations: Not on file     Relationship status: Not on file    Intimate partner violence:     Fear of current or ex partner: Not on file     Emotionally abused: Not on file     Physically abused: Not on file     Forced sexual activity: Not on file   Other Topics Concern    Not on file   Social History Narrative    Not on file      Family History   Problem Relation Age of Onset    Colon cancer Mother 68    Breast cancer Mother 40    Hypertension Mother     Heart disease Mother     Hyperlipidemia Mother     Hypertension Father     Heart disease Father     Hypertension Sister     No Known Problems Daughter     Stroke Maternal Grandmother     Colon cancer Maternal Grandfather     Stroke Maternal Grandfather     Cancer Paternal [de-identified]     Colon cancer Paternal Grandfather     Hypertension Sister     No Known Problems Daughter     No Known Problems Maternal Aunt     Pancreatic cancer Paternal Aunt      Past Surgical History:   Procedure Laterality Date    COLONOSCOPY      12/9 done Dr Milton Kern / Elsa Yoder Given Jeannetta Given 7/29/16    DIAGNOSTIC LAPAROSCOPY      Ovarian cystectomy    EXPLORATORY LAPAROTOMY  1986    Ovarian cystectomy / 1986    LAPAROSCOPY      Ovarian cystectomy    WISDOM TOOTH EXTRACTION         Current Outpatient Medications:     aspirin 81 MG tablet, Take 1 tablet by mouth daily, Disp: , Rfl:     Calcium Carbonate-Vitamin D (CALCIUM 600+D) 600-400 MG-UNIT per tablet, Take 1 tablet by mouth daily, Disp: , Rfl:     cyanocobalamin (VITAMIN B-12) 100 MCG tablet, Take 100 mcg by mouth daily, Disp: , Rfl:     lisinopril-hydrochlorothiazide (PRINZIDE,ZESTORETIC) 10-12 5 MG per tablet, TAKE ONE TABLET BY MOUTH EVERY DAY, Disp: 90 tablet, Rfl: 3    Omega-3 Fatty Acids (FISH OIL) 1,000 mg, Take 1 tablet by mouth daily, Disp: , Rfl:     pravastatin (PRAVACHOL) 20 mg tablet, TAKE ONE TABLET BY MOUTH EVERY DAY, Disp: 90 tablet, Rfl: 3    pravastatin (PRAVACHOL) 20 mg tablet, TAKE ONE TABLET BY MOUTH EVERY DAY, Disp: 90 tablet, Rfl: 3  No Known Allergies      Labs:  Hospital Outpatient Visit on 09/24/2019   Component Date Value    Protocol Name 09/24/2019 CASPER SIT     Time In Exercise Phase 09/24/2019 00:03:00     MAX  SYSTOLIC BP 95/51/8564 128     Max Diastolic Bp 14/08/3408 86     Max Heart Rate 09/24/2019 130     Max Predicted Heart Rate 09/24/2019 160     Reason for Termination 09/24/2019 TEST COMPLETE        Test Indication 09/24/2019 Screening for CAD     Target Hr Formular 09/24/2019 (220 - Age)*100%     Chest Pain Statement 09/24/2019 none         Imaging: No results found  ECG:  Normal sinus rhythm, rightward axis, nonspecific intraventricular conduction delay    Review of Systems:  Review of Systems   Respiratory: Negative for cough, chest tightness and shortness of breath  Cardiovascular: Negative for chest pain, palpitations and leg swelling  All other systems reviewed and are negative          Vitals:    11/20/19 1107 11/20/19 1140   BP: 128/92 138/82   BP Location: Left arm    Patient Position: Sitting    Cuff Size: Standard    SpO2: 97%    Weight: 87 2 kg (192 lb 3 2 oz)    Height: 5' 3" (1 6 m)      Vitals:    11/20/19 1107   Weight: 87 2 kg (192 lb 3 2 oz)     Height: 5' 3" (160 cm)     Physical Exam:  General appearance:  Appears stated age, alert, well appearing and in no distress  HEENT:  PERRLA, EOMI, no scleral icterus, no conjunctival pallor  NECK:  Supple, No elevated JVP, no thyromegaly, no carotid bruits  HEART:  Regular rate and rhythm, normal S1/S2, no S3/S4, no murmur or rub  LUNGS:  Clear to auscultation bilaterally  ABDOMEN:  Soft, non-tender, positive bowel sounds, no rebound or guarding, no organomegaly   EXTREMITIES:  No edema  VASCULAR:  Normal pedal pulses   SKIN: No lesions or rashes on exposed skin  NEURO:  CN II-XII intact, no focal deficits

## 2020-09-08 ENCOUNTER — TELEPHONE (OUTPATIENT)
Dept: FAMILY MEDICINE CLINIC | Facility: CLINIC | Age: 60
End: 2020-09-08

## 2020-09-08 DIAGNOSIS — I10 ESSENTIAL HYPERTENSION: ICD-10-CM

## 2020-09-08 DIAGNOSIS — E78.5 DYSLIPIDEMIA: Primary | ICD-10-CM

## 2020-09-09 RX ORDER — LISINOPRIL AND HYDROCHLOROTHIAZIDE 12.5; 1 MG/1; MG/1
TABLET ORAL
Qty: 90 TABLET | Refills: 0 | Status: SHIPPED | OUTPATIENT
Start: 2020-09-09 | End: 2020-12-11

## 2020-09-09 RX ORDER — PRAVASTATIN SODIUM 20 MG
TABLET ORAL
Qty: 90 TABLET | Refills: 0 | Status: SHIPPED | OUTPATIENT
Start: 2020-09-09 | End: 2020-11-16 | Stop reason: SDUPTHER

## 2020-09-09 NOTE — TELEPHONE ENCOUNTER
Please contact patient  I just refilled her routine medications  Please advise to reschedule annual physical   I will place orders for routine blood work    Thank you

## 2020-11-03 ENCOUNTER — LAB (OUTPATIENT)
Dept: LAB | Facility: CLINIC | Age: 60
End: 2020-11-03
Payer: COMMERCIAL

## 2020-11-03 ENCOUNTER — TRANSCRIBE ORDERS (OUTPATIENT)
Dept: LAB | Facility: CLINIC | Age: 60
End: 2020-11-03

## 2020-11-03 DIAGNOSIS — E78.5 DYSLIPIDEMIA: ICD-10-CM

## 2020-11-03 DIAGNOSIS — I10 ESSENTIAL HYPERTENSION: ICD-10-CM

## 2020-11-03 LAB
ALBUMIN SERPL BCP-MCNC: 4.1 G/DL (ref 3.5–5)
ALP SERPL-CCNC: 87 U/L (ref 46–116)
ALT SERPL W P-5'-P-CCNC: 35 U/L (ref 12–78)
ANION GAP SERPL CALCULATED.3IONS-SCNC: 5 MMOL/L (ref 4–13)
AST SERPL W P-5'-P-CCNC: 12 U/L (ref 5–45)
BASOPHILS # BLD AUTO: 0.05 THOUSANDS/ΜL (ref 0–0.1)
BASOPHILS NFR BLD AUTO: 1 % (ref 0–1)
BILIRUB SERPL-MCNC: 0.42 MG/DL (ref 0.2–1)
BUN SERPL-MCNC: 18 MG/DL (ref 5–25)
CALCIUM SERPL-MCNC: 9 MG/DL (ref 8.3–10.1)
CHLORIDE SERPL-SCNC: 102 MMOL/L (ref 100–108)
CHOLEST SERPL-MCNC: 198 MG/DL (ref 50–200)
CO2 SERPL-SCNC: 30 MMOL/L (ref 21–32)
CREAT SERPL-MCNC: 0.94 MG/DL (ref 0.6–1.3)
EOSINOPHIL # BLD AUTO: 0.21 THOUSAND/ΜL (ref 0–0.61)
EOSINOPHIL NFR BLD AUTO: 3 % (ref 0–6)
ERYTHROCYTE [DISTWIDTH] IN BLOOD BY AUTOMATED COUNT: 12.8 % (ref 11.6–15.1)
GFR SERPL CREATININE-BSD FRML MDRD: 66 ML/MIN/1.73SQ M
GLUCOSE P FAST SERPL-MCNC: 88 MG/DL (ref 65–99)
HCT VFR BLD AUTO: 42.7 % (ref 34.8–46.1)
HDLC SERPL-MCNC: 59 MG/DL
HGB BLD-MCNC: 14.5 G/DL (ref 11.5–15.4)
IMM GRANULOCYTES # BLD AUTO: 0.02 THOUSAND/UL (ref 0–0.2)
IMM GRANULOCYTES NFR BLD AUTO: 0 % (ref 0–2)
LDLC SERPL CALC-MCNC: 117 MG/DL (ref 0–100)
LYMPHOCYTES # BLD AUTO: 2.51 THOUSANDS/ΜL (ref 0.6–4.47)
LYMPHOCYTES NFR BLD AUTO: 37 % (ref 14–44)
MCH RBC QN AUTO: 29.5 PG (ref 26.8–34.3)
MCHC RBC AUTO-ENTMCNC: 34 G/DL (ref 31.4–37.4)
MCV RBC AUTO: 87 FL (ref 82–98)
MONOCYTES # BLD AUTO: 0.53 THOUSAND/ΜL (ref 0.17–1.22)
MONOCYTES NFR BLD AUTO: 8 % (ref 4–12)
NEUTROPHILS # BLD AUTO: 3.42 THOUSANDS/ΜL (ref 1.85–7.62)
NEUTS SEG NFR BLD AUTO: 51 % (ref 43–75)
NONHDLC SERPL-MCNC: 139 MG/DL
NRBC BLD AUTO-RTO: 0 /100 WBCS
PLATELET # BLD AUTO: 284 THOUSANDS/UL (ref 149–390)
PMV BLD AUTO: 10 FL (ref 8.9–12.7)
POTASSIUM SERPL-SCNC: 3.5 MMOL/L (ref 3.5–5.3)
PROT SERPL-MCNC: 7.9 G/DL (ref 6.4–8.2)
RBC # BLD AUTO: 4.91 MILLION/UL (ref 3.81–5.12)
SODIUM SERPL-SCNC: 137 MMOL/L (ref 136–145)
TRIGL SERPL-MCNC: 111 MG/DL
TSH SERPL DL<=0.05 MIU/L-ACNC: 1.24 UIU/ML (ref 0.36–3.74)
WBC # BLD AUTO: 6.74 THOUSAND/UL (ref 4.31–10.16)

## 2020-11-03 PROCEDURE — 85025 COMPLETE CBC W/AUTO DIFF WBC: CPT

## 2020-11-03 PROCEDURE — 80053 COMPREHEN METABOLIC PANEL: CPT

## 2020-11-03 PROCEDURE — 84443 ASSAY THYROID STIM HORMONE: CPT

## 2020-11-03 PROCEDURE — 36415 COLL VENOUS BLD VENIPUNCTURE: CPT

## 2020-11-03 PROCEDURE — 80061 LIPID PANEL: CPT

## 2020-11-04 ENCOUNTER — TELEPHONE (OUTPATIENT)
Dept: FAMILY MEDICINE CLINIC | Facility: CLINIC | Age: 60
End: 2020-11-04

## 2020-11-16 ENCOUNTER — OFFICE VISIT (OUTPATIENT)
Dept: FAMILY MEDICINE CLINIC | Facility: CLINIC | Age: 60
End: 2020-11-16
Payer: COMMERCIAL

## 2020-11-16 VITALS
HEART RATE: 74 BPM | RESPIRATION RATE: 16 BRPM | WEIGHT: 193 LBS | DIASTOLIC BLOOD PRESSURE: 80 MMHG | BODY MASS INDEX: 34.2 KG/M2 | OXYGEN SATURATION: 98 % | TEMPERATURE: 97.8 F | HEIGHT: 63 IN | SYSTOLIC BLOOD PRESSURE: 124 MMHG

## 2020-11-16 DIAGNOSIS — Z00.00 ENCOUNTER FOR HEALTH MAINTENANCE EXAMINATION IN ADULT: Primary | ICD-10-CM

## 2020-11-16 DIAGNOSIS — I10 BENIGN ESSENTIAL HYPERTENSION: ICD-10-CM

## 2020-11-16 DIAGNOSIS — Z23 FLU VACCINE NEED: ICD-10-CM

## 2020-11-16 DIAGNOSIS — I44.7 LBBB (LEFT BUNDLE BRANCH BLOCK): ICD-10-CM

## 2020-11-16 DIAGNOSIS — Z12.31 ENCOUNTER FOR SCREENING MAMMOGRAM FOR BREAST CANCER: ICD-10-CM

## 2020-11-16 DIAGNOSIS — E78.5 DYSLIPIDEMIA: ICD-10-CM

## 2020-11-16 PROCEDURE — 90471 IMMUNIZATION ADMIN: CPT | Performed by: FAMILY MEDICINE

## 2020-11-16 PROCEDURE — 90682 RIV4 VACC RECOMBINANT DNA IM: CPT | Performed by: FAMILY MEDICINE

## 2020-11-16 PROCEDURE — 99396 PREV VISIT EST AGE 40-64: CPT | Performed by: FAMILY MEDICINE

## 2020-12-11 DIAGNOSIS — I10 ESSENTIAL HYPERTENSION: ICD-10-CM

## 2020-12-11 DIAGNOSIS — E78.5 HYPERLIPIDEMIA, UNSPECIFIED HYPERLIPIDEMIA TYPE: ICD-10-CM

## 2020-12-11 RX ORDER — PRAVASTATIN SODIUM 20 MG
TABLET ORAL
Qty: 90 TABLET | Refills: 3 | Status: SHIPPED | OUTPATIENT
Start: 2020-12-11 | End: 2021-08-31 | Stop reason: SDUPTHER

## 2020-12-11 RX ORDER — LISINOPRIL AND HYDROCHLOROTHIAZIDE 12.5; 1 MG/1; MG/1
TABLET ORAL
Qty: 90 TABLET | Refills: 3 | Status: SHIPPED | OUTPATIENT
Start: 2020-12-11 | End: 2021-08-31 | Stop reason: SDUPTHER

## 2020-12-30 ENCOUNTER — IMMUNIZATIONS (OUTPATIENT)
Dept: FAMILY MEDICINE CLINIC | Facility: HOSPITAL | Age: 60
End: 2020-12-30
Payer: COMMERCIAL

## 2020-12-30 DIAGNOSIS — Z23 ENCOUNTER FOR IMMUNIZATION: ICD-10-CM

## 2020-12-30 PROCEDURE — 0011A SARS-COV-2 / COVID-19 MRNA VACCINE (MODERNA) 100 MCG: CPT

## 2020-12-30 PROCEDURE — 91301 SARS-COV-2 / COVID-19 MRNA VACCINE (MODERNA) 100 MCG: CPT

## 2021-01-25 ENCOUNTER — IMMUNIZATIONS (OUTPATIENT)
Dept: FAMILY MEDICINE CLINIC | Facility: HOSPITAL | Age: 61
End: 2021-01-25

## 2021-01-25 DIAGNOSIS — Z23 ENCOUNTER FOR IMMUNIZATION: Primary | ICD-10-CM

## 2021-01-25 PROCEDURE — 91301 SARS-COV-2 / COVID-19 MRNA VACCINE (MODERNA) 100 MCG: CPT

## 2021-01-25 PROCEDURE — 0012A SARS-COV-2 / COVID-19 MRNA VACCINE (MODERNA) 100 MCG: CPT

## 2021-08-30 ENCOUNTER — APPOINTMENT (OUTPATIENT)
Dept: LAB | Facility: CLINIC | Age: 61
End: 2021-08-30
Payer: COMMERCIAL

## 2021-08-30 DIAGNOSIS — E78.5 DYSLIPIDEMIA: ICD-10-CM

## 2021-08-30 DIAGNOSIS — E78.5 DYSLIPIDEMIA: Primary | ICD-10-CM

## 2021-08-30 LAB
ALBUMIN SERPL BCP-MCNC: 3.2 G/DL (ref 3.5–5)
ALP SERPL-CCNC: 82 U/L (ref 46–116)
ALT SERPL W P-5'-P-CCNC: 29 U/L (ref 12–78)
ANION GAP SERPL CALCULATED.3IONS-SCNC: 4 MMOL/L (ref 4–13)
AST SERPL W P-5'-P-CCNC: 20 U/L (ref 5–45)
BILIRUB SERPL-MCNC: 0.4 MG/DL (ref 0.2–1)
BUN SERPL-MCNC: 14 MG/DL (ref 5–25)
CALCIUM ALBUM COR SERPL-MCNC: 9.4 MG/DL (ref 8.3–10.1)
CALCIUM SERPL-MCNC: 8.8 MG/DL (ref 8.3–10.1)
CHLORIDE SERPL-SCNC: 107 MMOL/L (ref 100–108)
CHOLEST SERPL-MCNC: 185 MG/DL (ref 50–200)
CO2 SERPL-SCNC: 28 MMOL/L (ref 21–32)
CREAT SERPL-MCNC: 0.81 MG/DL (ref 0.6–1.3)
GFR SERPL CREATININE-BSD FRML MDRD: 78 ML/MIN/1.73SQ M
GLUCOSE SERPL-MCNC: 90 MG/DL (ref 65–140)
HDLC SERPL-MCNC: 58 MG/DL
LDLC SERPL CALC-MCNC: 105 MG/DL (ref 0–100)
POTASSIUM SERPL-SCNC: 3.8 MMOL/L (ref 3.5–5.3)
PROT SERPL-MCNC: 7 G/DL (ref 6.4–8.2)
SODIUM SERPL-SCNC: 139 MMOL/L (ref 136–145)
TRIGL SERPL-MCNC: 112 MG/DL
TSH SERPL DL<=0.05 MIU/L-ACNC: 1.4 UIU/ML (ref 0.36–3.74)

## 2021-08-30 PROCEDURE — 84443 ASSAY THYROID STIM HORMONE: CPT

## 2021-08-30 PROCEDURE — 80061 LIPID PANEL: CPT

## 2021-08-30 PROCEDURE — 80053 COMPREHEN METABOLIC PANEL: CPT

## 2021-08-31 ENCOUNTER — OFFICE VISIT (OUTPATIENT)
Dept: FAMILY MEDICINE CLINIC | Facility: CLINIC | Age: 61
End: 2021-08-31
Payer: COMMERCIAL

## 2021-08-31 VITALS
WEIGHT: 197.4 LBS | TEMPERATURE: 98.9 F | HEART RATE: 65 BPM | RESPIRATION RATE: 17 BRPM | SYSTOLIC BLOOD PRESSURE: 128 MMHG | DIASTOLIC BLOOD PRESSURE: 78 MMHG | HEIGHT: 63 IN | BODY MASS INDEX: 34.98 KG/M2 | OXYGEN SATURATION: 98 %

## 2021-08-31 DIAGNOSIS — E78.5 DYSLIPIDEMIA: ICD-10-CM

## 2021-08-31 DIAGNOSIS — I10 ESSENTIAL HYPERTENSION: ICD-10-CM

## 2021-08-31 DIAGNOSIS — E78.5 HYPERLIPIDEMIA, UNSPECIFIED HYPERLIPIDEMIA TYPE: ICD-10-CM

## 2021-08-31 DIAGNOSIS — I10 BENIGN ESSENTIAL HYPERTENSION: Primary | ICD-10-CM

## 2021-08-31 DIAGNOSIS — Z13.1 ENCOUNTER FOR SCREENING FOR DIABETES MELLITUS: ICD-10-CM

## 2021-08-31 DIAGNOSIS — I44.7 LBBB (LEFT BUNDLE BRANCH BLOCK): ICD-10-CM

## 2021-08-31 DIAGNOSIS — E66.9 OBESITY (BMI 30.0-34.9): ICD-10-CM

## 2021-08-31 PROCEDURE — 99214 OFFICE O/P EST MOD 30 MIN: CPT | Performed by: FAMILY MEDICINE

## 2021-08-31 RX ORDER — PRAVASTATIN SODIUM 20 MG
20 TABLET ORAL DAILY
Qty: 90 TABLET | Refills: 3 | Status: SHIPPED | OUTPATIENT
Start: 2021-08-31

## 2021-08-31 RX ORDER — LISINOPRIL AND HYDROCHLOROTHIAZIDE 12.5; 1 MG/1; MG/1
1 TABLET ORAL DAILY
Qty: 90 TABLET | Refills: 3 | Status: SHIPPED | OUTPATIENT
Start: 2021-08-31

## 2021-08-31 NOTE — PROGRESS NOTES
FAMILY PRACTICE OFFICE VISIT       NAME: Meaghan Gonzalez  AGE: 57 y o  SEX: female       : 1960        MRN: [id number]        Assessment and Plan     1  Benign essential hypertension  Assessment & Plan:    Blood pressure is well controlled, continue lisinopril HCT    Orders:  -     CBC and differential; Future; Expected date: 2022  -     Comprehensive metabolic panel; Future; Expected date: 2022    2  Dyslipidemia  Assessment & Plan:   Continue fish oil and pravastatin 20 mg daily  Low-fat low-cholesterol diet, we discussed importance of weight loss and exercise  Orders:  -     Lipid Panel with Direct LDL reflex; Future; Expected date: 2022  -     TSH, 3rd generation; Future; Expected date: 2022    3  LBBB (left bundle branch block)  Assessment & Plan:    Patient denies symptoms of angina, dyspnea palpitations  Negative nuclear stress test 2019   She follows up with [facility]  Continue aspirin and statin      4  Encounter for screening for diabetes mellitus  -     Hemoglobin A1C; Future; Expected date: 2022    5  Essential hypertension  -     lisinopril-hydrochlorothiazide (PRINZIDE,ZESTORETIC) 10-12 5 MG per tablet; Take 1 tablet by mouth daily    6  Hyperlipidemia, unspecified hyperlipidemia type  -     pravastatin (PRAVACHOL) 20 mg tablet; Take 1 tablet (20 mg total) by mouth daily    7  Obesity (BMI 30 0-34  9)    Patient presents for annual checkup  She has been feeling well  We discussed importance of exercise and weight loss  Patient is due for gyn exam/Pap smear  She is scheduled for mammogram tomorrow  Follow-up annually and as needed    BMI Counseling: Body mass index is 34 97 kg/m²  The BMI is above normal  Nutrition recommendations include encouraging healthy choices of fruits and vegetables, consuming healthier snacks, moderation in carbohydrate intake and reducing intake of cholesterol   Exercise recommendations include exercising 3-5 times per week  No pharmacotherapy was ordered  Patient referred to PCP due to patient being overweight  There are no Patient Instructions on file for this visit  No follow-ups on file  Discussed with the patient and all questioned fully answered  She will call me if any problems arise  M*Modal software was used to dictate this note  It may contain errors with dictating incorrect words/spelling  Please contact provider directly with any questions  Chief Complaint     Chief Complaint   Patient presents with    Physical Exam       History of Present Illness     Patient presents for annual checkup  She remains on daily medications for hypertension and hyperlipidemia  Results of recent blood work discussed  Unremarkable CMP, lipid panel, TSH and hemoglobin A1c  Patient remains on lisinopril HCTZ for hypertension pravastatin for hyperlipidemia, she remains on fish oil capsules  Cholesterol has improved compared to previous year  Patient unfortunately has not lost any weight  She maintains overall active lifestyle but is not exercising on a regular basis  Patient with known left bundle branch block  She was evaluated by [facility] and will follow-up with them on a regular basis  She denies symptoms of angina, dyspnea, palpitations or dizziness  Patient is up-to-date with colonoscopy screening  She is scheduled for mammogram on September 1st   Last Pap smear in 2018  Patient is up-to-date with COVID-19 vaccination  She has been handling stress of recent endemic well  Review of Systems   Review of Systems   Constitutional: Negative  HENT: Negative  Eyes: Negative  Respiratory: Negative  Cardiovascular: Negative  Gastrointestinal: Negative  Endocrine: Negative  Genitourinary: Negative  Musculoskeletal: Negative  Skin: Negative  Allergic/Immunologic: Negative  Neurological: Negative  Hematological: Negative  Psychiatric/Behavioral: Negative  Active Problem List     Patient Active Problem List   Diagnosis    Benign essential hypertension    Dyslipidemia    LBBB (left bundle branch block)    Obesity (BMI 30 0-34  9)       Past Medical History:  Past Medical History:   Diagnosis Date    Anemia     Hypercholesterolemia     Hypertension     LA  Jerryl Rm Jerryl Rm 7/28/17     Hypertension     Left bundle branch block        Past Surgical History:  Past Surgical History:   Procedure Laterality Date    COLONOSCOPY      12/9 done Dr Terri Masters / Mahesh Smith  Jerryl Rm Jerryl Rm 7/29/16    DIAGNOSTIC LAPAROSCOPY      Ovarian cystectomy    EXPLORATORY LAPAROTOMY  1986    Ovarian cystectomy / 1986    LAPAROSCOPY      Ovarian cystectomy    WISDOM TOOTH EXTRACTION         Family History:  Family History   Problem Relation Age of Onset    Colon cancer Mother 73    Breast cancer Mother 40    Hypertension Mother     Heart disease Mother    Sirena Birch Hyperlipidemia Mother     Hypertension Father     Heart disease Father     Hypertension Sister     No Known Problems Daughter     Stroke Maternal Grandmother     Colon cancer Maternal Grandfather     Stroke Maternal Grandfather     Cancer Paternal Grandmother     Colon cancer Paternal Grandfather     Hypertension Sister     No Known Problems Daughter     No Known Problems Maternal Aunt     Pancreatic cancer Paternal Aunt        Social History:  Social History     Socioeconomic History    Marital status: /Civil Union     Spouse name: Not on file    Number of children: Not on file    Years of education: Not on file    Highest education level: Not on file   Occupational History    Not on file   Tobacco Use    Smoking status: Never Smoker    Smokeless tobacco: Never Used   Substance and Sexual Activity    Alcohol use: Yes     Comment: social    Drug use: No    Sexual activity: Not on file   Other Topics Concern    Not on file   Social History Narrative    Not on file     Social Determinants of Health     Financial Resource Strain:     Difficulty of Paying Living Expenses:    Food Insecurity:     Worried About [facility] in the Last Year:     [address] in the Last Year:    Transportation Needs:     Lack of Transportation (Medical):  Lack of Transportation (Non-Medical):    Physical Activity:     Days of Exercise per Week:     Minutes of Exercise per Session:    Stress:     Feeling of Stress :    Social Connections:     Frequency of Communication with Friends and Family:     Frequency of Social Gatherings with Friends and Family:     Attends Muslim Services:     Active Member of Clubs or Organizations:     Attends Club or Organization Meetings:     Marital Status:    Intimate Partner Violence:     Fear of Current or Ex-Partner:     Emotionally Abused:     Physically Abused:     Sexually Abused:          Objective     Vitals:    08/31/21 1637 08/31/21 1659   BP: 124/86 128/78   BP Location: Left arm    Patient Position: Sitting    Cuff Size: Large    Pulse: 65    Resp: 17    Temp: 98 9 °F (37 2 °C)    TempSrc: Tympanic    SpO2: 98%    Weight: 89 5 kg (197 lb 6 4 oz)    Height: 5' 3" (1 6 m)        Wt Readings from Last 3 Encounters:   09/01/21 89 5 kg (197 lb 6 4 oz)   08/31/21 89 5 kg (197 lb 6 4 oz)   11/16/20 87 5 kg (193 lb)       Physical Exam  Vitals and nursing note reviewed  Constitutional:       General: She is not in acute distress  Appearance: Normal appearance  She is well-developed  She is not ill-appearing  HENT:      Head: Normocephalic and atraumatic  Eyes:      Conjunctiva/sclera: Conjunctivae normal    Neck:      Thyroid: No thyromegaly  Vascular: No carotid bruit  Cardiovascular:      Rate and Rhythm: Normal rate and regular rhythm  Heart sounds: Normal heart sounds  No murmur heard  Pulmonary:      Effort: Pulmonary effort is normal  No respiratory distress  Breath sounds: Normal breath sounds  No wheezing  Abdominal:      General: Bowel sounds are normal  There is no abdominal bruit  Palpations: Abdomen is soft  Tenderness: There is no abdominal tenderness  Musculoskeletal:         General: Normal range of motion  Cervical back: Neck supple  No rigidity  Right lower leg: No edema  Left lower leg: No edema  Neurological:      General: No focal deficit present  Mental Status: She is alert and oriented to person, place, and time  Cranial Nerves: No cranial nerve deficit  Coordination: Coordination normal    Psychiatric:         Mood and Affect: Mood normal          Behavior: Behavior normal          Thought Content:  Thought content normal           Pertinent Laboratory/Diagnostic Studies:    Lab Results   Component Value Date    WBC 6 74 11/03/2020    HGB 14 5 11/03/2020    HCT 42 7 11/03/2020    MCV 87 11/03/2020     11/03/2020       No results found for: TSH    Lab Results   Component Value Date    CHOL 196 07/28/2015     Lab Results   Component Value Date    TRIG 112 08/30/2021     Lab Results   Component Value Date    HDL 58 08/30/2021     Lab Results   Component Value Date    LDLCALC 105 (H) 08/30/2021     Lab Results   Component Value Date    HGBA1C 5 5 08/30/2021     Lab Results   Component Value Date    SODIUM 139 08/30/2021    K 3 8 08/30/2021     08/30/2021    CO2 28 08/30/2021    ANIONGAP 9 07/28/2015    AGAP 4 08/30/2021    BUN 14 08/30/2021    CREATININE 0 81 08/30/2021    GLUC 90 08/30/2021    GLUF 88 11/03/2020    CALCIUM 8 8 08/30/2021    AST 20 08/30/2021    ALT 29 08/30/2021    ALKPHOS 82 08/30/2021    PROT 7 7 07/28/2015    TP 7 0 08/30/2021    BILITOT 0 43 07/28/2015    TBILI 0 40 08/30/2021    EGFR 78 08/30/2021       Orders Placed This Encounter   Procedures    CBC and differential    Comprehensive metabolic panel    Lipid Panel with Direct LDL reflex    TSH, 3rd generation    Hemoglobin A1C       ALLERGIES:  No Known Allergies    Current Medications     Current Outpatient Medications   Medication Sig Dispense Refill    aspirin 81 MG tablet Take 1 tablet by mouth daily      Calcium Carbonate-Vitamin D (CALCIUM 600+D) 600-400 MG-UNIT per tablet Take 1 tablet by mouth daily      cyanocobalamin (VITAMIN B-12) 100 MCG tablet Take 100 mcg by mouth daily      lisinopril-hydrochlorothiazide (PRINZIDE,ZESTORETIC) 10-12 5 MG per tablet Take 1 tablet by mouth daily 90 tablet 3    Omega-3 Fatty Acids (FISH OIL) 1,000 mg Take 1 tablet by mouth daily      pravastatin (PRAVACHOL) 20 mg tablet Take 1 tablet (20 mg total) by mouth daily 90 tablet 3     No current facility-administered medications for this visit         Medications Discontinued During This Encounter   Medication Reason    lisinopril-hydrochlorothiazide (PRINZIDE,ZESTORETIC) 10-12 5 MG per tablet Reorder    pravastatin (PRAVACHOL) 20 mg tablet Reorder       Health Maintenance     Health Maintenance   Topic Date Due    HIV Screening  Never done    DTaP,Tdap,and Td Vaccines (1 - Tdap) Never done    Cervical Cancer Screening  08/20/2021    Influenza Vaccine (1) 09/01/2021    Annual Physical  11/16/2021    BMI: Followup Plan  11/22/2021    Depression Screening PHQ  08/31/2022    BMI: Adult  09/01/2022    Breast Cancer Screening: Mammogram  09/01/2023    Colorectal Cancer Screening  09/30/2024    Hepatitis C Screening  Completed    COVID-19 Vaccine  Completed    Pneumococcal Vaccine: Pediatrics (0 to 5 Years) and At-Risk Patients (6 to 59 Years)  Aged Out    HIB Vaccine  Aged Out    Hepatitis B Vaccine  Aged Out    IPV Vaccine  Aged Out    Hepatitis A Vaccine  Aged Out    Meningococcal ACWY Vaccine  Aged Out    HPV Vaccine  Aged [employer] History   Administered Date(s) Administered    H1N1, All Formulations 12/15/2009    INFLUENZA 11/01/2018    Influenza Quadrivalent Preservative Free 3 years and older IM 11/08/2016    Influenza, recombinant, quadrivalent,injectable, preservative free 11/16/2020    Influenza, seasonal, injectable 09/02/2015    SARS-CoV-2 / COVID-19 mRNA IM (Adwoa Matthew) 12/30/2020, 01/25/2021       Florinda Garcia MD

## 2021-09-01 ENCOUNTER — HOSPITAL ENCOUNTER (OUTPATIENT)
Dept: RADIOLOGY | Age: 61
Discharge: HOME | End: 2021-09-01
Payer: COMMERCIAL

## 2021-09-01 VITALS — WEIGHT: 197.4 LBS | BODY MASS INDEX: 34.98 KG/M2 | HEIGHT: 63 IN

## 2021-09-01 DIAGNOSIS — Z12.31 ENCOUNTER FOR SCREENING MAMMOGRAM FOR BREAST CANCER: ICD-10-CM

## 2021-09-01 PROCEDURE — 77063 BREAST TOMOSYNTHESIS BI: CPT

## 2021-09-01 PROCEDURE — 77067 SCR MAMMO BI INCL CAD: CPT

## 2021-09-05 NOTE — ASSESSMENT & PLAN NOTE
Continue fish oil and pravastatin 20 mg daily  Low-fat low-cholesterol diet, we discussed importance of weight loss and exercise

## 2021-09-05 NOTE — ASSESSMENT & PLAN NOTE
Patient denies symptoms of angina, dyspnea palpitations  Negative nuclear stress test September 2019   She follows up with [facility]      Continue aspirin and statin

## 2021-12-10 ENCOUNTER — IMMUNIZATIONS (OUTPATIENT)
Dept: FAMILY MEDICINE CLINIC | Facility: HOSPITAL | Age: 61
End: 2021-12-10

## 2021-12-10 DIAGNOSIS — Z23 ENCOUNTER FOR IMMUNIZATION: Primary | ICD-10-CM

## 2021-12-10 PROCEDURE — 91306 COVID-19 MODERNA VACC 0.25 ML BOOSTER: CPT

## 2021-12-10 PROCEDURE — 0064A COVID-19 MODERNA VACC 0.25 ML BOOSTER: CPT

## 2022-06-07 ENCOUNTER — OFFICE VISIT (OUTPATIENT)
Dept: FAMILY MEDICINE CLINIC | Facility: CLINIC | Age: 62
End: 2022-06-07
Payer: COMMERCIAL

## 2022-06-07 VITALS
RESPIRATION RATE: 16 BRPM | WEIGHT: 192 LBS | DIASTOLIC BLOOD PRESSURE: 60 MMHG | TEMPERATURE: 97.6 F | SYSTOLIC BLOOD PRESSURE: 120 MMHG | HEIGHT: 63 IN | HEART RATE: 81 BPM | OXYGEN SATURATION: 99 % | BODY MASS INDEX: 34.02 KG/M2

## 2022-06-07 DIAGNOSIS — I10 BENIGN ESSENTIAL HYPERTENSION: ICD-10-CM

## 2022-06-07 DIAGNOSIS — I44.7 LBBB (LEFT BUNDLE BRANCH BLOCK): Chronic | ICD-10-CM

## 2022-06-07 DIAGNOSIS — M25.469 EDEMA OF KNEE: ICD-10-CM

## 2022-06-07 DIAGNOSIS — E78.5 DYSLIPIDEMIA: ICD-10-CM

## 2022-06-07 DIAGNOSIS — Z00.00 ENCOUNTER FOR WELLNESS EXAMINATION IN ADULT: Primary | ICD-10-CM

## 2022-06-07 DIAGNOSIS — Z12.31 ENCOUNTER FOR SCREENING MAMMOGRAM FOR BREAST CANCER: ICD-10-CM

## 2022-06-07 PROCEDURE — 99396 PREV VISIT EST AGE 40-64: CPT | Performed by: FAMILY MEDICINE

## 2022-06-07 NOTE — PROGRESS NOTES
FAMILY PRACTICE OFFICE VISIT       NAME: Suzanne Sánchez  AGE: 57 y o  SEX: female       : 1960        MRN: [id number]        Assessment and Plan     1  Encounter for wellness examination in adult    2  Benign essential hypertension  Assessment & Plan:  Lisinopril HCTZ 10/12 5 mg daily  Blood pressure is well controlled      3  Dyslipidemia  Assessment & Plan:  Pravastatin 20 mg daily, proceed with blood work      4  Encounter for screening mammogram for breast cancer  -     Mammo screening bilateral w 3d & cad; Future; Expected date: 2022    5  Edema of knee  -     Lyme Total Antibody Profile with reflex to WB; Future  -     XR knee 3 vw left non injury; Future; Expected date: 2022    6  LBBB (left bundle branch block)  Assessment & Plan:  Negative nuclear stress test in 2019  No recent follow-up with Cardiology, patient will set up  Continue statin aspirin  Patient is asymptomatic        Annual well exam   Assessment and plan as outlined above  Patient will proceed with blood work  We will evaluate mild left knee edema with x-ray and testing for Lyme disease  Patient denies symptoms of generalized myalgias, arthralgias, headaches or fever  Given the fact [place] highly endemic area I will have low threshold of treating her for presumptive Lyme disease, but will await for testing results  Patient is up-to-date with health screenings  Continue annual mammography  She is scheduled for gyn exam     Patient will try finger splint for left force trigger finger  If symptoms persist-she will proceed with consultation by Hand surgery  BMI Counseling: Body mass index is 34 01 kg/m²  The BMI is above normal  Nutrition recommendations include encouraging healthy choices of fruits and vegetables, consuming healthier snacks, moderation in carbohydrate intake, reducing intake of saturated and trans fat and reducing intake of cholesterol   Exercise recommendations include exercising 3-5 times per week  No pharmacotherapy was ordered  Patient referred to PCP  Rationale for BMI follow-up plan is due to patient being overweight or obese  Depression Screening and Follow-up Plan: Patient was screened for depression during today's encounter  They screened negative with a PHQ-2 score of 0  There are no Patient Instructions on file for this visit  Return in about 1 year (around 6/7/2023)  Discussed with the patient and all questioned fully answered  She will call me if any problems arise  M*Modal software was used to dictate this note  It may contain errors with dictating incorrect words/spelling  Please contact provider directly with any questions  Chief Complaint     Chief Complaint   Patient presents with    Physical Exam    Right hand pain     Occurs in the morning     Knee Pain     Left sided        History of Present Illness     Annual well exam  Patient has been making an effort to improve her diet and continue with regular exercise  Pending blood work  Daily Rx for HTN and hyperlipidemia    Last mammo 9/1/21  Last colonoscopy 2019  Last GYN OV 2019 -Emilio Ormond - scheduled annual exam today    Seen by cardiology back in 2019 due to diagnosis of left bundle branch block, no recent follow-ups  No complaints of chest pain, palpitations, shortness of breath or dizziness  Left sided knee swelling, started 3 weeks ago, noticed mild limp  No injury or fall, no other joint pains  Patient found at least to deer ticks not deeply attached to engorged  One of those deer ticks was attached to the right lateral neck  Patient also reports intermittent symptoms of left 4th digit trigger finger that usually occurs in the morning and improves as the day goes on  Review of Systems   Review of Systems   Constitutional: Negative  HENT: Negative  Eyes: Negative  Respiratory: Negative  Cardiovascular: Negative      Gastrointestinal: Negative  Endocrine: Negative  Genitourinary: Negative  Musculoskeletal: Positive for arthralgias  Negative for myalgias  Skin: Negative  Allergic/Immunologic: Negative  Neurological: Negative  Hematological: Negative  Psychiatric/Behavioral: Negative  Active Problem List     Patient Active Problem List   Diagnosis    Benign essential hypertension    Dyslipidemia    LBBB (left bundle branch block)    Obesity (BMI 30 0-34  9)       Past Medical History:  Past Medical History:   Diagnosis Date    Anemia     Hypercholesterolemia     Hypertension     [place]  Larri Milam Larri Li 7/28/17     Hypertension     Left bundle branch block        Past Surgical History:  Past Surgical History:   Procedure Laterality Date    COLONOSCOPY      12/9 done Dr Maria Alejandra Sánchez / Maik Escamilla  Larri Li Larri Li 7/29/16    DIAGNOSTIC LAPAROSCOPY      Ovarian cystectomy    EXPLORATORY LAPAROTOMY  1986    Ovarian cystectomy / 1986    LAPAROSCOPY      Ovarian cystectomy    WISDOM TOOTH EXTRACTION         Family History:  Family History   Problem Relation Age of Onset    Colon cancer Mother 73    Breast cancer Mother 40    Hypertension Mother     Heart disease Mother     Hyperlipidemia Mother     Hypertension Father     Heart disease Father     Hypertension Sister     No Known Problems Daughter     Stroke Maternal Grandmother     Colon cancer Maternal Grandfather     Stroke Maternal Grandfather     Cancer Paternal Grandmother     Colon cancer Paternal Grandfather     Hypertension Sister     No Known Problems Daughter     No Known Problems Maternal Aunt     Pancreatic cancer Paternal Aunt        Social History:  Social History     Socioeconomic History    Marital status: /Civil Union     Spouse name: Not on file    Number of children: Not on file    Years of education: Not on file    Highest education level: Not on file   Occupational History    Not on file   Tobacco Use    Smoking status: Never Smoker    Smokeless tobacco: Never Used   Vaping Use    Vaping Use: Never used   Substance and Sexual Activity    Alcohol use: Yes     Comment: social    Drug use: No    Sexual activity: Yes     Partners: Male   Other Topics Concern    Not on file   Social History Narrative    Not on file     Social Determinants of Health     Financial Resource Strain: Not on file   Food Insecurity: Not on file   Transportation Needs: Not on file   Physical Activity: Not on file   Stress: Not on file   Social Connections: Not on file   Intimate Partner Violence: Not on file   Housing Stability: Not on file         Objective     Vitals:    06/07/22 1636   BP: 120/60   BP Location: Left arm   Patient Position: Sitting   Cuff Size: Standard   Pulse: 81   Resp: 16   Temp: 97 6 °F (36 4 °C)   TempSrc: Temporal   SpO2: 99%   Weight: 87 1 kg (192 lb)   Height: 5' 3" (1 6 m)       Wt Readings from Last 3 Encounters:   06/07/22 87 1 kg (192 lb)   09/01/21 89 5 kg (197 lb 6 4 oz)   08/31/21 89 5 kg (197 lb 6 4 oz)       Physical Exam  Vitals and nursing note reviewed  Constitutional:       General: She is not in acute distress  Appearance: Normal appearance  She is well-developed  She is not ill-appearing  HENT:      Head: Normocephalic and atraumatic  Eyes:      Conjunctiva/sclera: Conjunctivae normal    Neck:      Thyroid: No thyromegaly  Vascular: No carotid bruit  Cardiovascular:      Rate and Rhythm: Normal rate and regular rhythm  Heart sounds: Normal heart sounds  No murmur heard  Pulmonary:      Effort: Pulmonary effort is normal  No respiratory distress  Breath sounds: Normal breath sounds  No wheezing  Abdominal:      General: Bowel sounds are normal  There is no distension or abdominal bruit  Palpations: Abdomen is soft  Tenderness: There is no abdominal tenderness  Musculoskeletal:         General: Normal range of motion  Cervical back: Neck supple  No rigidity  Right lower leg: No edema        Left lower leg: No edema  Comments: Left knee:  Mild edema, minimal warmth  No discoloration, no calf tenderness or edema  No tenderness with palpation  Full range of motion  Skin:     General: Skin is warm  Comments: Trace of possible tick bite right lateral neck - erythematous ring of 0 5 cm, no bull's eye rash  No cellulitic change  Neurological:      General: No focal deficit present  Mental Status: She is alert and oriented to person, place, and time  Cranial Nerves: No cranial nerve deficit  Coordination: Coordination normal    Psychiatric:         Mood and Affect: Mood normal          Behavior: Behavior normal          Thought Content:  Thought content normal           Pertinent Laboratory/Diagnostic Studies:    Lab Results   Component Value Date    WBC 5 71 06/08/2022    HGB 13 2 06/08/2022    HCT 41 0 06/08/2022    MCV 89 06/08/2022     06/08/2022       No results found for: TSH    Lab Results   Component Value Date    CHOL 196 07/28/2015     Lab Results   Component Value Date    TRIG 111 06/08/2022     Lab Results   Component Value Date    HDL 49 (L) 06/08/2022     Lab Results   Component Value Date    LDLCALC 114 (H) 06/08/2022     Lab Results   Component Value Date    HGBA1C 5 3 06/08/2022     Lab Results   Component Value Date    SODIUM 140 06/08/2022    K 4 0 06/08/2022     06/08/2022    CO2 26 06/08/2022    ANIONGAP 9 07/28/2015    AGAP 9 06/08/2022    BUN 13 06/08/2022    CREATININE 0 95 06/08/2022    GLUC 90 08/30/2021    GLUF 96 06/08/2022    CALCIUM 9 3 06/08/2022    AST 25 06/08/2022    ALT 40 06/08/2022    ALKPHOS 77 06/08/2022    PROT 7 7 07/28/2015    TP 7 6 06/08/2022    BILITOT 0 43 07/28/2015    TBILI 0 64 06/08/2022    EGFR 64 06/08/2022       Orders Placed This Encounter   Procedures    Mammo screening bilateral w 3d & cad    XR knee 3 vw left non injury    Lyme Total Antibody Profile with reflex to WB       ALLERGIES:  No Known Allergies    Current Medications     Current Outpatient Medications   Medication Sig Dispense Refill    aspirin 81 MG tablet Take 1 tablet by mouth daily      Calcium Carbonate-Vitamin D 600-400 MG-UNIT per tablet Take 1 tablet by mouth daily      cyanocobalamin (VITAMIN B-12) 100 MCG tablet Take 100 mcg by mouth daily      lisinopril-hydrochlorothiazide (PRINZIDE,ZESTORETIC) 10-12 5 MG per tablet Take 1 tablet by mouth daily 90 tablet 3    Omega-3 Fatty Acids (FISH OIL) 1,000 mg Take 1 tablet by mouth daily      pravastatin (PRAVACHOL) 20 mg tablet Take 1 tablet (20 mg total) by mouth daily 90 tablet 3     No current facility-administered medications for this visit  There are no discontinued medications      Health Maintenance     Health Maintenance   Topic Date Due    HIV Screening  Never done    DTaP,Tdap,and Td Vaccines (1 - Tdap) Never done    COVID-19 Vaccine (4 - Booster for Moderna series) 04/10/2022    Influenza Vaccine (Season Ended) 09/01/2022    Depression Screening  06/07/2023    BMI: Adult  06/07/2023    Annual Physical  06/07/2023    BMI: Followup Plan  06/08/2023    Cervical Cancer Screening  08/20/2023    Breast Cancer Screening: Mammogram  09/01/2023    Colorectal Cancer Screening  09/30/2024    Hepatitis C Screening  Completed    Pneumococcal Vaccine: Pediatrics (0 to 5 Years) and At-Risk Patients (6 to 59 Years)  Aged Out    HIB Vaccine  Aged Out    Hepatitis B Vaccine  Aged Out    IPV Vaccine  Aged Out    Hepatitis A Vaccine  Aged Out    Meningococcal ACWY Vaccine  Aged Out    HPV Vaccine  Aged [employer] History   Administered Date(s) Administered    COVID-19 MODERNA VACC 0 25 ML IM BOOSTER 12/10/2021    COVID-19 MODERNA VACC 0 5 ML IM 12/30/2020, 01/25/2021    H1N1, All Formulations 12/15/2009    INFLUENZA 11/01/2018    Influenza Quadrivalent Preservative Free 3 years and older IM 11/08/2016    Influenza, recombinant, quadrivalent,injectable, preservative free 11/16/2020    Influenza, seasonal, injectable 09/02/2015       Lindsey Small MD

## 2022-06-08 ENCOUNTER — HOSPITAL ENCOUNTER (OUTPATIENT)
Dept: RADIOLOGY | Facility: HOSPITAL | Age: 62
Discharge: HOME | End: 2022-06-08
Payer: COMMERCIAL

## 2022-06-08 ENCOUNTER — APPOINTMENT (OUTPATIENT)
Dept: LAB | Facility: CLINIC | Age: 62
End: 2022-06-08
Payer: COMMERCIAL

## 2022-06-08 DIAGNOSIS — E78.5 DYSLIPIDEMIA: ICD-10-CM

## 2022-06-08 DIAGNOSIS — M25.469 EDEMA OF KNEE: ICD-10-CM

## 2022-06-08 DIAGNOSIS — Z13.1 ENCOUNTER FOR SCREENING FOR DIABETES MELLITUS: ICD-10-CM

## 2022-06-08 DIAGNOSIS — I10 BENIGN ESSENTIAL HYPERTENSION: ICD-10-CM

## 2022-06-08 PROBLEM — I44.7 LBBB (LEFT BUNDLE BRANCH BLOCK): Chronic | Status: ACTIVE | Noted: 2019-10-03

## 2022-06-08 LAB
ALBUMIN SERPL BCP-MCNC: 3.5 G/DL (ref 3.5–5)
ALP SERPL-CCNC: 77 U/L (ref 46–116)
ALT SERPL W P-5'-P-CCNC: 40 U/L (ref 12–78)
ANION GAP SERPL CALCULATED.3IONS-SCNC: 9 MMOL/L (ref 4–13)
AST SERPL W P-5'-P-CCNC: 25 U/L (ref 5–45)
BASOPHILS # BLD AUTO: 0.04 THOUSANDS/ΜL (ref 0–0.1)
BASOPHILS NFR BLD AUTO: 1 % (ref 0–1)
BILIRUB SERPL-MCNC: 0.64 MG/DL (ref 0.2–1)
BUN SERPL-MCNC: 13 MG/DL (ref 5–25)
CALCIUM SERPL-MCNC: 9.3 MG/DL (ref 8.3–10.1)
CHLORIDE SERPL-SCNC: 105 MMOL/L (ref 100–108)
CHOLEST SERPL-MCNC: 185 MG/DL
CO2 SERPL-SCNC: 26 MMOL/L (ref 21–32)
CREAT SERPL-MCNC: 0.95 MG/DL (ref 0.6–1.3)
EOSINOPHIL # BLD AUTO: 0.23 THOUSAND/ΜL (ref 0–0.61)
EOSINOPHIL NFR BLD AUTO: 4 % (ref 0–6)
ERYTHROCYTE [DISTWIDTH] IN BLOOD BY AUTOMATED COUNT: 13 % (ref 11.6–15.1)
EST. AVERAGE GLUCOSE BLD GHB EST-MCNC: 105 MG/DL
GFR SERPL CREATININE-BSD FRML MDRD: 64 ML/MIN/1.73SQ M
GLUCOSE P FAST SERPL-MCNC: 96 MG/DL (ref 65–99)
HBA1C MFR BLD: 5.3 %
HCT VFR BLD AUTO: 41 % (ref 34.8–46.1)
HDLC SERPL-MCNC: 49 MG/DL
HGB BLD-MCNC: 13.2 G/DL (ref 11.5–15.4)
IMM GRANULOCYTES # BLD AUTO: 0.02 THOUSAND/UL (ref 0–0.2)
IMM GRANULOCYTES NFR BLD AUTO: 0 % (ref 0–2)
LDLC SERPL CALC-MCNC: 114 MG/DL (ref 0–100)
LYMPHOCYTES # BLD AUTO: 1.81 THOUSANDS/ΜL (ref 0.6–4.47)
LYMPHOCYTES NFR BLD AUTO: 32 % (ref 14–44)
MCH RBC QN AUTO: 28.6 PG (ref 26.8–34.3)
MCHC RBC AUTO-ENTMCNC: 32.2 G/DL (ref 31.4–37.4)
MCV RBC AUTO: 89 FL (ref 82–98)
MONOCYTES # BLD AUTO: 0.54 THOUSAND/ΜL (ref 0.17–1.22)
MONOCYTES NFR BLD AUTO: 10 % (ref 4–12)
NEUTROPHILS # BLD AUTO: 3.07 THOUSANDS/ΜL (ref 1.85–7.62)
NEUTS SEG NFR BLD AUTO: 53 % (ref 43–75)
NRBC BLD AUTO-RTO: 0 /100 WBCS
PLATELET # BLD AUTO: 271 THOUSANDS/UL (ref 149–390)
PMV BLD AUTO: 11 FL (ref 8.9–12.7)
POTASSIUM SERPL-SCNC: 4 MMOL/L (ref 3.5–5.3)
PROT SERPL-MCNC: 7.6 G/DL (ref 6.4–8.2)
RBC # BLD AUTO: 4.62 MILLION/UL (ref 3.81–5.12)
SODIUM SERPL-SCNC: 140 MMOL/L (ref 136–145)
TRIGL SERPL-MCNC: 111 MG/DL
TSH SERPL DL<=0.05 MIU/L-ACNC: 1.6 UIU/ML (ref 0.45–4.5)
WBC # BLD AUTO: 5.71 THOUSAND/UL (ref 4.31–10.16)

## 2022-06-08 PROCEDURE — 73562 X-RAY EXAM OF KNEE 3: CPT

## 2022-06-08 PROCEDURE — 85025 COMPLETE CBC W/AUTO DIFF WBC: CPT

## 2022-06-08 PROCEDURE — 84443 ASSAY THYROID STIM HORMONE: CPT

## 2022-06-08 PROCEDURE — 80053 COMPREHEN METABOLIC PANEL: CPT

## 2022-06-08 PROCEDURE — 86618 LYME DISEASE ANTIBODY: CPT

## 2022-06-08 PROCEDURE — 80061 LIPID PANEL: CPT

## 2022-06-08 PROCEDURE — 36415 COLL VENOUS BLD VENIPUNCTURE: CPT

## 2022-06-08 PROCEDURE — 83036 HEMOGLOBIN GLYCOSYLATED A1C: CPT

## 2022-06-09 LAB — B BURGDOR IGG+IGM SER-ACNC: 18

## 2022-06-09 NOTE — ASSESSMENT & PLAN NOTE
Negative nuclear stress test in 2019  No recent follow-up with Cardiology, patient will set up  Continue statin aspirin    Patient is asymptomatic

## 2022-06-14 ENCOUNTER — TELEPHONE (OUTPATIENT)
Dept: FAMILY MEDICINE CLINIC | Facility: CLINIC | Age: 62
End: 2022-06-14

## 2022-06-14 NOTE — TELEPHONE ENCOUNTER
Spoke with pt on her cell phone #  MD instructions given  Pt is not having any symptoms listed  She was advised to call with any reoccurring symptoms

## 2022-06-14 NOTE — TELEPHONE ENCOUNTER
Please contact patient regarding blood work in knee x-ray results  · All blood work is normal, continue same daily medications  Lyme test was negative  · Knee x-ray reveals arthritic changes in left knee  Please ask patient if left knee swelling and discomfort are still persisting and if she has any symptoms of generalized joint pain, fatigue, headache or rash?     Thank you

## 2022-07-14 ENCOUNTER — ESTABLISHED COMPREHENSIVE EXAM (OUTPATIENT)
Dept: URBAN - METROPOLITAN AREA CLINIC 6 | Facility: CLINIC | Age: 62
End: 2022-07-14

## 2022-07-14 DIAGNOSIS — H04.123: ICD-10-CM

## 2022-07-14 DIAGNOSIS — H25.13: ICD-10-CM

## 2022-07-14 PROCEDURE — 92015 DETERMINE REFRACTIVE STATE: CPT

## 2022-07-14 PROCEDURE — 92014 COMPRE OPH EXAM EST PT 1/>: CPT

## 2022-07-14 ASSESSMENT — VISUAL ACUITY
OS_CC: 20/25-2
OD_CC: 20/20-2
OU_CC: J1

## 2022-07-14 ASSESSMENT — TONOMETRY
OS_IOP_MMHG: 19
OD_IOP_MMHG: 18

## 2022-09-01 ENCOUNTER — ANNUAL EXAM (OUTPATIENT)
Dept: OBGYN CLINIC | Facility: CLINIC | Age: 62
End: 2022-09-01
Payer: COMMERCIAL

## 2022-09-01 VITALS
BODY MASS INDEX: 33.84 KG/M2 | HEIGHT: 63 IN | SYSTOLIC BLOOD PRESSURE: 124 MMHG | DIASTOLIC BLOOD PRESSURE: 78 MMHG | WEIGHT: 191 LBS

## 2022-09-01 DIAGNOSIS — Z12.31 ENCOUNTER FOR SCREENING MAMMOGRAM FOR MALIGNANT NEOPLASM OF BREAST: ICD-10-CM

## 2022-09-01 DIAGNOSIS — Z01.419 ENCOUNTER FOR ANNUAL ROUTINE GYNECOLOGICAL EXAMINATION: Primary | ICD-10-CM

## 2022-09-01 PROCEDURE — G0145 SCR C/V CYTO,THINLAYER,RESCR: HCPCS | Performed by: OBSTETRICS & GYNECOLOGY

## 2022-09-01 PROCEDURE — G0476 HPV COMBO ASSAY CA SCREEN: HCPCS | Performed by: OBSTETRICS & GYNECOLOGY

## 2022-09-01 PROCEDURE — S0612 ANNUAL GYNECOLOGICAL EXAMINA: HCPCS | Performed by: OBSTETRICS & GYNECOLOGY

## 2022-09-01 NOTE — PROGRESS NOTES
Assessment/Plan:  Pap smear done as well as annual   Encouraged self-breast examination as well as calcium supplementation  Continue annual mammogram   Reviewed colon cancer screening, up-to-date with colonoscopy  She will continue to follow-up with primary care as scheduled  Return to office in 1 year or p r n  No problem-specific Assessment & Plan notes found for this encounter  Diagnoses and all orders for this visit:    Encounter for annual routine gynecological examination  -     Liquid-based pap, screening    Encounter for screening mammogram for malignant neoplasm of breast  -     Mammo screening bilateral w 3d & cad; Future          Subjective:      Patient ID: Woo Gomez is a 60 y o  female  HPI     This is a pleasant 39-year-old female  ( x3) presents for gyn exam   Last gyn exam was approximately 3 years ago  She went through menopause at age 45  She has never been on hormone replacement therapy  She denies any vaginal bleeding or spotting  There has been no changes in bowel or bladder function  She has been in a monogamous relationship with her  for over 40 years  Pap smears have been normal   Last Pap   Her gyn history significant for stable 3 cm fibroid  Surgeries significant for multiple ovarian cystectomies in   Colonoscopy done  with follow-up in 5 years  She does follow up with Cardiology and primary care on a regular basis  The following portions of the patient's history were reviewed and updated as appropriate: allergies, current medications, past family history, past medical history, past social history, past surgical history and problem list     Review of Systems   Constitutional: Negative for fatigue, fever and unexpected weight change  Respiratory: Negative for cough, chest tightness, shortness of breath and wheezing  Cardiovascular: Negative  Negative for chest pain and palpitations  Gastrointestinal: Negative    Negative for abdominal distention, abdominal pain, blood in stool, constipation, diarrhea, nausea and vomiting  Genitourinary: Negative  Negative for difficulty urinating, dyspareunia, dysuria, flank pain, frequency, genital sores, hematuria, pelvic pain, urgency, vaginal bleeding, vaginal discharge and vaginal pain  Skin: Negative for rash  Objective:      /78   Ht 5' 3" (1 6 m)   Wt 86 6 kg (191 lb)   BMI 33 83 kg/m²          Physical Exam  Constitutional:       Appearance: Normal appearance  She is well-developed  Cardiovascular:      Rate and Rhythm: Normal rate and regular rhythm  Pulmonary:      Effort: Pulmonary effort is normal       Breath sounds: Normal breath sounds  Chest:   Breasts:      Right: No inverted nipple, mass, nipple discharge, skin change or tenderness  Left: No inverted nipple, mass, nipple discharge, skin change or tenderness  Abdominal:      General: Bowel sounds are normal  There is no distension  Palpations: Abdomen is soft  Tenderness: There is no abdominal tenderness  There is no guarding or rebound  Genitourinary:     Labia:         Right: No rash, tenderness or lesion  Left: No rash, tenderness or lesion  Vagina: Normal  No signs of injury  No vaginal discharge or tenderness  Cervix: No cervical motion tenderness, discharge, friability, lesion, erythema or cervical bleeding  Uterus: Not enlarged and not tender  Adnexa:         Right: No mass, tenderness or fullness  Left: No mass, tenderness or fullness  Neurological:      Mental Status: She is alert and oriented to person, place, and time  Psychiatric:         Behavior: Behavior normal        external genitalia is within normal limits  The vagina is evident of estrogen deficiency  There is no pelvic floor prolapse  Rectovaginal exam is confirmatory

## 2022-09-02 LAB
HPV HR 12 DNA CVX QL NAA+PROBE: NEGATIVE
HPV16 DNA CVX QL NAA+PROBE: NEGATIVE
HPV18 DNA CVX QL NAA+PROBE: NEGATIVE

## 2022-09-07 LAB
LAB AP GYN PRIMARY INTERPRETATION: NORMAL
Lab: NORMAL

## 2022-12-04 DIAGNOSIS — E78.5 HYPERLIPIDEMIA, UNSPECIFIED HYPERLIPIDEMIA TYPE: ICD-10-CM

## 2022-12-04 DIAGNOSIS — I10 ESSENTIAL HYPERTENSION: ICD-10-CM

## 2022-12-04 RX ORDER — PRAVASTATIN SODIUM 20 MG
TABLET ORAL
Qty: 90 TABLET | Refills: 0 | Status: SHIPPED | OUTPATIENT
Start: 2022-12-04

## 2022-12-04 RX ORDER — LISINOPRIL AND HYDROCHLOROTHIAZIDE 12.5; 1 MG/1; MG/1
TABLET ORAL
Qty: 90 TABLET | Refills: 0 | Status: SHIPPED | OUTPATIENT
Start: 2022-12-04

## 2023-02-23 ENCOUNTER — HOSPITAL ENCOUNTER (OUTPATIENT)
Dept: RADIOLOGY | Age: 63
Discharge: HOME | End: 2023-02-23

## 2023-02-23 VITALS — HEIGHT: 63 IN | WEIGHT: 185 LBS | BODY MASS INDEX: 32.78 KG/M2

## 2023-02-23 DIAGNOSIS — Z12.31 ENCOUNTER FOR SCREENING MAMMOGRAM FOR MALIGNANT NEOPLASM OF BREAST: ICD-10-CM

## 2023-06-05 DIAGNOSIS — I10 ESSENTIAL HYPERTENSION: ICD-10-CM

## 2023-06-05 DIAGNOSIS — E78.5 HYPERLIPIDEMIA, UNSPECIFIED HYPERLIPIDEMIA TYPE: ICD-10-CM

## 2023-06-06 RX ORDER — LISINOPRIL AND HYDROCHLOROTHIAZIDE 12.5; 1 MG/1; MG/1
TABLET ORAL
Qty: 90 TABLET | Refills: 0 | Status: SHIPPED | OUTPATIENT
Start: 2023-06-06

## 2023-06-06 RX ORDER — PRAVASTATIN SODIUM 20 MG
TABLET ORAL
Qty: 90 TABLET | Refills: 0 | Status: SHIPPED | OUTPATIENT
Start: 2023-06-06 | End: 2023-06-12

## 2023-06-08 ENCOUNTER — APPOINTMENT (OUTPATIENT)
Dept: LAB | Facility: CLINIC | Age: 63
End: 2023-06-08
Payer: COMMERCIAL

## 2023-06-08 ENCOUNTER — APPOINTMENT (OUTPATIENT)
Dept: LAB | Facility: CLINIC | Age: 63
End: 2023-06-08

## 2023-06-08 ENCOUNTER — OFFICE VISIT (OUTPATIENT)
Dept: FAMILY MEDICINE CLINIC | Facility: CLINIC | Age: 63
End: 2023-06-08
Payer: COMMERCIAL

## 2023-06-08 VITALS
DIASTOLIC BLOOD PRESSURE: 82 MMHG | WEIGHT: 199.2 LBS | RESPIRATION RATE: 16 BRPM | HEIGHT: 63 IN | HEART RATE: 70 BPM | BODY MASS INDEX: 35.3 KG/M2 | SYSTOLIC BLOOD PRESSURE: 120 MMHG | OXYGEN SATURATION: 98 % | TEMPERATURE: 97.8 F

## 2023-06-08 DIAGNOSIS — Z82.49 FAMILY HISTORY OF HEART DISEASE: ICD-10-CM

## 2023-06-08 DIAGNOSIS — I10 BENIGN ESSENTIAL HYPERTENSION: ICD-10-CM

## 2023-06-08 DIAGNOSIS — Z00.00 ENCOUNTER FOR WELLNESS EXAMINATION IN ADULT: Primary | ICD-10-CM

## 2023-06-08 DIAGNOSIS — E78.5 DYSLIPIDEMIA: ICD-10-CM

## 2023-06-08 DIAGNOSIS — Z23 NEED FOR SHINGLES VACCINE: ICD-10-CM

## 2023-06-08 DIAGNOSIS — Z13.1 ENCOUNTER FOR SCREENING FOR DIABETES MELLITUS: ICD-10-CM

## 2023-06-08 DIAGNOSIS — I44.7 LBBB (LEFT BUNDLE BRANCH BLOCK): Chronic | ICD-10-CM

## 2023-06-08 LAB
ALBUMIN SERPL BCP-MCNC: 3.6 G/DL (ref 3.5–5)
ALP SERPL-CCNC: 80 U/L (ref 46–116)
ALT SERPL W P-5'-P-CCNC: 32 U/L (ref 12–78)
ANION GAP SERPL CALCULATED.3IONS-SCNC: 2 MMOL/L (ref 4–13)
AST SERPL W P-5'-P-CCNC: 21 U/L (ref 5–45)
BASOPHILS # BLD AUTO: 0.05 THOUSANDS/ÂΜL (ref 0–0.1)
BASOPHILS NFR BLD AUTO: 1 % (ref 0–1)
BILIRUB SERPL-MCNC: 0.29 MG/DL (ref 0.2–1)
BUN SERPL-MCNC: 19 MG/DL (ref 5–25)
CALCIUM SERPL-MCNC: 9.5 MG/DL (ref 8.3–10.1)
CHLORIDE SERPL-SCNC: 110 MMOL/L (ref 96–108)
CHOLEST SERPL-MCNC: 233 MG/DL
CO2 SERPL-SCNC: 29 MMOL/L (ref 21–32)
CREAT SERPL-MCNC: 0.93 MG/DL (ref 0.6–1.3)
EOSINOPHIL # BLD AUTO: 0.17 THOUSAND/ÂΜL (ref 0–0.61)
EOSINOPHIL NFR BLD AUTO: 3 % (ref 0–6)
ERYTHROCYTE [DISTWIDTH] IN BLOOD BY AUTOMATED COUNT: 13 % (ref 11.6–15.1)
EST. AVERAGE GLUCOSE BLD GHB EST-MCNC: 105 MG/DL
GFR SERPL CREATININE-BSD FRML MDRD: 65 ML/MIN/1.73SQ M
GLUCOSE P FAST SERPL-MCNC: 96 MG/DL (ref 65–99)
HBA1C MFR BLD: 5.3 %
HCT VFR BLD AUTO: 42 % (ref 34.8–46.1)
HDLC SERPL-MCNC: 56 MG/DL
HGB BLD-MCNC: 13.9 G/DL (ref 11.5–15.4)
IMM GRANULOCYTES # BLD AUTO: 0.02 THOUSAND/UL (ref 0–0.2)
IMM GRANULOCYTES NFR BLD AUTO: 0 % (ref 0–2)
LDLC SERPL CALC-MCNC: 146 MG/DL (ref 0–100)
LYMPHOCYTES # BLD AUTO: 2.32 THOUSANDS/ÂΜL (ref 0.6–4.47)
LYMPHOCYTES NFR BLD AUTO: 39 % (ref 14–44)
MCH RBC QN AUTO: 29.3 PG (ref 26.8–34.3)
MCHC RBC AUTO-ENTMCNC: 33.1 G/DL (ref 31.4–37.4)
MCV RBC AUTO: 88 FL (ref 82–98)
MONOCYTES # BLD AUTO: 0.59 THOUSAND/ÂΜL (ref 0.17–1.22)
MONOCYTES NFR BLD AUTO: 10 % (ref 4–12)
NEUTROPHILS # BLD AUTO: 2.88 THOUSANDS/ÂΜL (ref 1.85–7.62)
NEUTS SEG NFR BLD AUTO: 47 % (ref 43–75)
NRBC BLD AUTO-RTO: 0 /100 WBCS
PLATELET # BLD AUTO: 256 THOUSANDS/UL (ref 149–390)
PMV BLD AUTO: 11.4 FL (ref 8.9–12.7)
POTASSIUM SERPL-SCNC: 4.6 MMOL/L (ref 3.5–5.3)
PROT SERPL-MCNC: 7.7 G/DL (ref 6.4–8.4)
RBC # BLD AUTO: 4.75 MILLION/UL (ref 3.81–5.12)
SODIUM SERPL-SCNC: 141 MMOL/L (ref 135–147)
TRIGL SERPL-MCNC: 157 MG/DL
TSH SERPL DL<=0.05 MIU/L-ACNC: 1.63 UIU/ML (ref 0.45–4.5)
WBC # BLD AUTO: 6.03 THOUSAND/UL (ref 4.31–10.16)

## 2023-06-08 PROCEDURE — 99396 PREV VISIT EST AGE 40-64: CPT | Performed by: FAMILY MEDICINE

## 2023-06-08 PROCEDURE — 83036 HEMOGLOBIN GLYCOSYLATED A1C: CPT

## 2023-06-08 PROCEDURE — 99213 OFFICE O/P EST LOW 20 MIN: CPT | Performed by: FAMILY MEDICINE

## 2023-06-08 PROCEDURE — 36415 COLL VENOUS BLD VENIPUNCTURE: CPT

## 2023-06-08 PROCEDURE — 84443 ASSAY THYROID STIM HORMONE: CPT

## 2023-06-08 PROCEDURE — 90471 IMMUNIZATION ADMIN: CPT | Performed by: FAMILY MEDICINE

## 2023-06-08 PROCEDURE — 80053 COMPREHEN METABOLIC PANEL: CPT

## 2023-06-08 PROCEDURE — 90750 HZV VACC RECOMBINANT IM: CPT | Performed by: FAMILY MEDICINE

## 2023-06-08 PROCEDURE — 80061 LIPID PANEL: CPT

## 2023-06-08 PROCEDURE — 85025 COMPLETE CBC W/AUTO DIFF WBC: CPT

## 2023-06-08 NOTE — PROGRESS NOTES
Name: Kristopher Ramirez      : 1960      MRN: [id number]  Encounter Provider: Mohan Scruggs MD  Encounter Date: 2023   Encounter department: [address] [place] [address]      1  Encounter for wellness examination in adult  Comments:  Patient is UTD with GYN exam, mammography and colonoscopy  Start Shingrix today    2  Need for shingles vaccine  -     Zoster Vaccine Recombinant IM    3  Dyslipidemia  Assessment & Plan:  Continue Pravastatin    Orders:  -     Lipid Panel with Direct LDL reflex; Future  -     TSH, 3rd generation; Future  -     CT coronary calcium score; Future; Expected date: 2023    4  Benign essential hypertension  Assessment & Plan:  Well controlled on Lisinopril/HCTZ 20/12 5 mg daily    Orders:  -     CBC and differential; Future  -     Comprehensive metabolic panel; Future    5  Encounter for screening for diabetes mellitus  -     Hemoglobin A1C; Future    6  LBBB (left bundle branch block)  Assessment & Plan:  Asymptomatic  FHx -CAD  Dr Cintron -pt is due for f/up    Orders:  -     CT coronary calcium score; Future; Expected date: 2023  -     Ambulatory Referral to Cardiology; Future    7  Family history of heart disease  Comments:  t/c caclium score study  Orders:  -     CT coronary calcium score; Future; Expected date: 2023  -     Ambulatory Referral to Cardiology; Future      Follow up 2 months for Shingrix #2 and annual f/up with PCP  BMI Counseling: Body mass index is 35 29 kg/m²  The BMI is above normal  Nutrition recommendations include encouraging healthy choices of fruits and vegetables, consuming healthier snacks, moderation in carbohydrate intake, reducing intake of saturated and trans fat and reducing intake of cholesterol  Exercise recommendations include exercising 3-5 times per week  No pharmacotherapy was ordered  Patient referred to PCP  Rationale for BMI follow-up plan is due to patient being overweight or obese  Depression Screening and Follow-up Plan: Patient was screened for depression during today's encounter  They screened negative with a PHQ-2 score of 0  Subjective     Annual well exam    UTD with PAP/HPV- 9/2022, [facility]  UTD with mammography    Feels well  Patient will proceed with labs this am  She remains on daily Rx for HTN/hyperlipidemia  H/oLBBB ,no CP  No recent f/up with cardiology,was seen with Dr Cintron        Review of Systems   Constitutional: Negative  HENT: Negative  Eyes: Negative  Respiratory: Negative  Cardiovascular: Negative  Gastrointestinal: Negative  Endocrine: Negative  Genitourinary: Negative  Musculoskeletal: Negative  Skin: Negative  Allergic/Immunologic: Negative  Neurological: Negative  Hematological: Negative  Psychiatric/Behavioral: Negative  Past Medical History:   Diagnosis Date   • Anemia    • Hypercholesterolemia    • Hypertension     [place]  [facility]    • Hypertension    • Left bundle branch block      Past Surgical History:   Procedure Laterality Date   • COLONOSCOPY      12/9 done Dr Familia Guallpa / Rafiq Mcmullen [facility] 7/29/16   • DIAGNOSTIC LAPAROSCOPY      Ovarian cystectomy   • EXPLORATORY LAPAROTOMY  1986    Ovarian cystectomy / 1986   • LAPAROSCOPY      Ovarian cystectomy   • WISDOM TOOTH EXTRACTION       Family History   Problem Relation Age of Onset   • Colon cancer Mother 68   • Breast cancer Mother 40   • Hypertension Mother    • Heart disease Mother    • Hyperlipidemia Mother    • Hypertension Father    • Heart disease Father    • Hypertension Sister    • No Known Problems Daughter    • Stroke Maternal Grandmother    • Colon cancer Maternal Grandfather    • Stroke Maternal Grandfather    • Cancer Paternal Grandmother    • Colon cancer Paternal Grandfather    • Hypertension Sister    • No Known Problems Daughter    • No Known Problems Maternal Aunt    • Pancreatic cancer Paternal Aunt      Social History     Socioeconomic History   • Marital status: /Civil Union     Spouse name: None   • Number of children: None   • Years of education: None   • Highest education level: None   Occupational History   • None   Tobacco Use   • Smoking status: Never   • Smokeless tobacco: Never   Vaping Use   • Vaping Use: Never used   Substance and Sexual Activity   • Alcohol use: Yes     Comment: social   • Drug use: No   • Sexual activity: Yes     Partners: Male     Birth control/protection: Post-menopausal   Other Topics Concern   • None   Social History Narrative   • None     Social Determinants of Health     Financial Resource Strain: Not on file   Food Insecurity: Not on file   Transportation Needs: Not on file   Physical Activity: Not on file   Stress: Not on file   Social Connections: Not on file   Intimate Partner Violence: Not on file   Housing Stability: Not on file     Current Outpatient Medications on File Prior to Visit   Medication Sig   • aspirin 81 MG tablet Take 1 tablet by mouth daily   • Calcium Carbonate-Vitamin D 600-400 MG-UNIT per tablet Take 1 tablet by mouth daily   • cyanocobalamin (VITAMIN B-12) 100 MCG tablet Take 100 mcg by mouth daily   • lisinopril-hydrochlorothiazide (PRINZIDE,ZESTORETIC) 10-12 5 MG per tablet TAKE ONE TABLET BY MOUTH EVERY DAY   • Omega-3 Fatty Acids (FISH OIL) 1,000 mg Take 1 tablet by mouth daily   • pravastatin (PRAVACHOL) 20 mg tablet TAKE ONE TABLET BY MOUTH EVERY DAY     No Known Allergies  Immunization History   Administered Date(s) Administered   • COVID-19 MODERNA VACC 0 25 ML IM BOOSTER 12/10/2021   • COVID-19 MODERNA VACC 0 5 ML IM 12/30/2020, 01/25/2021   • H1N1, All Formulations 12/15/2009   • INFLUENZA 11/01/2018   • Influenza Quadrivalent Preservative Free 3 years and older IM 11/08/2016   • Influenza, recombinant, quadrivalent,injectable, preservative free 11/16/2020   • Influenza, seasonal, injectable 09/02/2015   • Zoster Vaccine Recombinant 06/08/2023       Objective     /82 (BP "Location: Left arm, Patient Position: Sitting, Cuff Size: Large)   Pulse 70   Temp 97 8 °F (36 6 °C) (Temporal)   Resp 16   Ht 5' 3\" (1 6 m)   Wt 90 4 kg (199 lb 3 2 oz)   SpO2 98%   BMI 35 29 kg/m²     Physical Exam  Vitals and nursing note reviewed  Constitutional:       General: She is not in acute distress  Appearance: Normal appearance  She is well-developed  She is not ill-appearing  HENT:      Head: Normocephalic and atraumatic  Mouth/Throat:      Mouth: Mucous membranes are moist    Eyes:      General: No scleral icterus  Conjunctiva/sclera: Conjunctivae normal    Neck:      Thyroid: No thyromegaly  Vascular: No carotid bruit  Cardiovascular:      Rate and Rhythm: Normal rate and regular rhythm  Heart sounds: Normal heart sounds  No murmur heard  Pulmonary:      Effort: Pulmonary effort is normal  No respiratory distress  Breath sounds: Normal breath sounds  No wheezing  Abdominal:      General: There is no distension or abdominal bruit  Palpations: Abdomen is soft  Tenderness: There is no abdominal tenderness  Hernia: No hernia is present  Musculoskeletal:         General: Normal range of motion  Cervical back: Neck supple  No rigidity  Right lower leg: No edema  Left lower leg: No edema  Skin:     General: Skin is warm  Neurological:      General: No focal deficit present  Mental Status: She is alert and oriented to person, place, and time  Cranial Nerves: No cranial nerve deficit  Coordination: Coordination normal    Psychiatric:         Mood and Affect: Mood normal          Behavior: Behavior normal          Thought Content:  Thought content normal        Max Osei MD  "

## 2023-06-12 DIAGNOSIS — E78.5 HYPERLIPIDEMIA, UNSPECIFIED HYPERLIPIDEMIA TYPE: ICD-10-CM

## 2023-06-12 RX ORDER — PRAVASTATIN SODIUM 40 MG
40 TABLET ORAL DAILY
Qty: 90 TABLET | Refills: 1 | Status: SHIPPED | OUTPATIENT
Start: 2023-06-12

## 2023-07-07 ENCOUNTER — HOSPITAL ENCOUNTER (OUTPATIENT)
Dept: RADIOLOGY | Facility: HOSPITAL | Age: 63
Discharge: HOME | End: 2023-07-07
Payer: COMMERCIAL

## 2023-07-07 DIAGNOSIS — Z82.49 FAMILY HISTORY OF HEART DISEASE: ICD-10-CM

## 2023-07-07 DIAGNOSIS — I44.7 LBBB (LEFT BUNDLE BRANCH BLOCK): Chronic | ICD-10-CM

## 2023-07-07 DIAGNOSIS — E78.5 DYSLIPIDEMIA: ICD-10-CM

## 2023-07-07 PROCEDURE — G1004 CDSM NDSC: HCPCS

## 2023-07-07 PROCEDURE — 75571 CT HRT W/O DYE W/CA TEST: CPT

## 2023-08-09 ENCOUNTER — CLINICAL SUPPORT (OUTPATIENT)
Dept: FAMILY MEDICINE CLINIC | Facility: CLINIC | Age: 63
End: 2023-08-09
Payer: COMMERCIAL

## 2023-08-09 DIAGNOSIS — Z23 ENCOUNTER FOR IMMUNIZATION: Primary | ICD-10-CM

## 2023-08-09 PROCEDURE — 90750 HZV VACC RECOMBINANT IM: CPT

## 2023-08-09 PROCEDURE — 90471 IMMUNIZATION ADMIN: CPT

## 2023-09-05 ENCOUNTER — ANNUAL EXAM (OUTPATIENT)
Dept: OBGYN CLINIC | Facility: CLINIC | Age: 63
End: 2023-09-05
Payer: COMMERCIAL

## 2023-09-05 VITALS
HEIGHT: 64 IN | SYSTOLIC BLOOD PRESSURE: 122 MMHG | BODY MASS INDEX: 32.61 KG/M2 | WEIGHT: 191 LBS | DIASTOLIC BLOOD PRESSURE: 78 MMHG

## 2023-09-05 DIAGNOSIS — Z01.419 ENCOUNTER FOR ANNUAL ROUTINE GYNECOLOGICAL EXAMINATION: Primary | ICD-10-CM

## 2023-09-05 DIAGNOSIS — Z12.31 ENCOUNTER FOR SCREENING MAMMOGRAM FOR MALIGNANT NEOPLASM OF BREAST: ICD-10-CM

## 2023-09-05 PROCEDURE — S0612 ANNUAL GYNECOLOGICAL EXAMINA: HCPCS | Performed by: OBSTETRICS & GYNECOLOGY

## 2023-09-05 PROCEDURE — G0145 SCR C/V CYTO,THINLAYER,RESCR: HCPCS | Performed by: OBSTETRICS & GYNECOLOGY

## 2023-09-05 NOTE — PROGRESS NOTES
Assessment/Plan:   Pap done for cytology reflex HPV. Encourage self breast examination as well as calcium supplementation. Continue annual mammogram.  Reviewed colon cancer screening, up-to-date. She will continue to follow-up with primary care as scheduled. Return to office in 1 year or as needed  No problem-specific Assessment & Plan notes found for this encounter. Diagnoses and all orders for this visit:    Encounter for annual routine gynecological examination  -     Liquid-based pap, screening    Encounter for screening mammogram for malignant neoplasm of breast          Subjective:      Patient ID: Martinez Bird is a 58 y.o. female. HPI     This is a pleasant 40-year-old female P3 ( x3) presents for GYN exam.  She went through menopause at age 45. She has never been on hormone replacement therapy. She denies any vaginal bleeding or spotting. No changes in bowel or bladder function. She has been in a monogamous relationship with her  for over 41 years. Pap smears have been normal.  She does have a history of small fibroid uterus, asymptomatic. She is also had several surgeries for multiple ovarian cystectomies 19 -. Colonoscopy  with follow-up in 5 years. She does follow-up with her cardiologist on a regular basis. The following portions of the patient's history were reviewed and updated as appropriate: allergies, current medications, past family history, past medical history, past social history, past surgical history and problem list.    Review of Systems   Constitutional: Negative for fatigue, fever and unexpected weight change. Respiratory: Negative for cough, chest tightness, shortness of breath and wheezing. Cardiovascular: Negative. Negative for chest pain and palpitations. Gastrointestinal: Negative. Negative for abdominal distention, abdominal pain, blood in stool, constipation, diarrhea, nausea and vomiting. Genitourinary: Negative.   Negative for difficulty urinating, dyspareunia, dysuria, flank pain, frequency, genital sores, hematuria, pelvic pain, urgency, vaginal bleeding, vaginal discharge and vaginal pain. Skin: Negative for rash. Objective:      /78   Ht 5' 4" (1.626 m)   Wt 86.6 kg (191 lb)   BMI 32.79 kg/m²          Physical Exam  Constitutional:       Appearance: Normal appearance. She is well-developed. Cardiovascular:      Rate and Rhythm: Normal rate and regular rhythm. Pulmonary:      Effort: Pulmonary effort is normal.      Breath sounds: Normal breath sounds. Chest:   Breasts:     Right: No inverted nipple, mass, nipple discharge, skin change or tenderness. Left: No inverted nipple, mass, nipple discharge, skin change or tenderness. Abdominal:      General: Bowel sounds are normal. There is no distension. Palpations: Abdomen is soft. Tenderness: There is no abdominal tenderness. There is no guarding or rebound. Genitourinary:     Labia:         Right: No rash, tenderness or lesion. Left: No rash, tenderness or lesion. Vagina: Normal. No signs of injury. No vaginal discharge or tenderness. Cervix: No cervical motion tenderness, discharge, friability, lesion, erythema or cervical bleeding. Uterus: Not enlarged and not tender. Adnexa:         Right: No mass, tenderness or fullness. Left: No mass, tenderness or fullness. Neurological:      Mental Status: She is alert and oriented to person, place, and time.

## 2023-09-12 LAB
LAB AP GYN PRIMARY INTERPRETATION: NORMAL
Lab: NORMAL

## 2023-10-09 ENCOUNTER — TELEPHONE (OUTPATIENT)
Dept: FAMILY MEDICINE CLINIC | Facility: CLINIC | Age: 63
End: 2023-10-09

## 2023-10-09 NOTE — TELEPHONE ENCOUNTER
Pt requesting message to be sent to Dr. Rudy Mauro as she wasn't sure of her [employer] password. Pt's family member is looking for a geriatrician and she is wondering if Dr. Rudy Mauro had anyone in mind that she would recommend.

## 2023-10-10 NOTE — TELEPHONE ENCOUNTER
Unfortunately, I do not have any personal referral in this field. [facility]Caitlin Paulino.   Thank you

## 2023-12-08 DIAGNOSIS — I10 ESSENTIAL HYPERTENSION: ICD-10-CM

## 2023-12-08 RX ORDER — LISINOPRIL AND HYDROCHLOROTHIAZIDE 12.5; 1 MG/1; MG/1
TABLET ORAL
Qty: 90 TABLET | Refills: 0 | Status: SHIPPED | OUTPATIENT
Start: 2023-12-08

## 2024-01-24 ENCOUNTER — OFFICE VISIT (OUTPATIENT)
Dept: CARDIOLOGY CLINIC | Facility: CLINIC | Age: 64
End: 2024-01-24
Payer: COMMERCIAL

## 2024-01-24 VITALS
BODY MASS INDEX: 34.02 KG/M2 | DIASTOLIC BLOOD PRESSURE: 88 MMHG | WEIGHT: 192 LBS | HEART RATE: 58 BPM | SYSTOLIC BLOOD PRESSURE: 144 MMHG | HEIGHT: 63 IN

## 2024-01-24 DIAGNOSIS — E78.5 DYSLIPIDEMIA: ICD-10-CM

## 2024-01-24 DIAGNOSIS — I10 BENIGN ESSENTIAL HYPERTENSION: ICD-10-CM

## 2024-01-24 DIAGNOSIS — I44.7 LBBB (LEFT BUNDLE BRANCH BLOCK): Primary | Chronic | ICD-10-CM

## 2024-01-24 DIAGNOSIS — E66.9 OBESITY (BMI 30.0-34.9): ICD-10-CM

## 2024-01-24 PROCEDURE — 99244 OFF/OP CNSLTJ NEW/EST MOD 40: CPT | Performed by: INTERNAL MEDICINE

## 2024-01-24 PROCEDURE — 93000 ELECTROCARDIOGRAM COMPLETE: CPT | Performed by: INTERNAL MEDICINE

## 2024-01-24 NOTE — PROGRESS NOTES
Cardiology Consultation     Estefania Douglas  [id number]  1960  HEART & VASCULAR [facility]  [address] [place] [id number]      1. LBBB (left bundle branch block)  POCT ECG    Echo complete w/ contrast if indicated      2. Benign essential hypertension        3. Dyslipidemia        4. Obesity (BMI 30.0-34.9)          Discussion/Summary:  Mrs. Douglas is a pleasant 63-year-old female who presents to the office today for re-evaluation of a known left bundle branch block.    She is sedentary but asymptomatic.  She had a stress test done in 2019 which was without ischemia or scar.  No further ischemic evaluation is recommended.  Given the left bundle branch block I have asked that she undergo an updated echocardiogram to re-evaluate her EF.     Her blood pressure is elevated in the office today but did come down upon my recheck.  It appears to have been well-controlled in the recent past during various office visits.  No changes were made to her medication regimen.  A low-salt diet was reinforced.  Her blood pressure will be reassessed when she sees her primary care provider in the near future.  She also reports she has the capability to check her blood pressure at home and I have asked her to do so and report persistently elevated readings to the office.    Otherwise her most recent lipids do reveal suboptimal numbers.  She admits she is only taking pravastatin 20 mg daily.  Even on this dose she has had suboptimal lipids.  I did recommend transition to another agent like atorvastatin or rosuvastatin.  She would like to attempt the 40 mg of pravastatin and will have her lipids reassessed prior to her visit with her primary care provider.  Of note she did undergo a calcium score in 2023 which was 0.     She will follow up in the office on a yearly basis.     History of Present Illness:  Mrs. Douglas is a pleasant 63-year-old female  who presents to the office to re-establish care given a known left bundle branch block and family history for premature CAD.  She was last seen in 2019.     She leads a sedentary lifestyle.  With the activity she is able to perform including carrying a laundry basket up a flight of steps she denies any cardiopulmonary symptoms of chest pain or shortness of breath.  She denies any signs or symptoms of congestive heart failure including increasing lower extremity edema, paroxysmal nocturnal dyspnea, orthopnea, acute weight gain or increasing abdominal girth.  She denies lightheadedness, syncope or presyncope.  She denies palpitations or symptoms of claudication.      She reports her primary care provider did increase her pravastatin in the recent past from 20 mg to 40 mg based on suboptimal lipids.  She admits that she is only taking 20 mg daily.     She reports a family history of CAD.  She states her father suffered a myocardial infarction at the age of 39 and  from a myocardial infarction at the age of 45. Her mother is also  and had heart attack in her mid 70s.        Patient Active Problem List   Diagnosis    Benign essential hypertension    Dyslipidemia    LBBB (left bundle branch block)    Obesity (BMI 30.0-34.9)     Past Medical History:   Diagnosis Date    Anemia     Hypercholesterolemia     Hypertension     LA...17     Hypertension     Left bundle branch block      Social History     Socioeconomic History    Marital status: /Civil Union     Spouse name: Not on file    Number of children: Not on file    Years of education: Not on file    Highest education level: Not on file   Occupational History    Not on file   Tobacco Use    Smoking status: Never    Smokeless tobacco: Never   Vaping Use    Vaping status: Never Used   Substance and Sexual Activity    Alcohol use: Yes     Comment: social    Drug use: No    Sexual activity: Yes     Partners: Male     Birth control/protection:  Post-menopausal   Other Topics Concern    Not on file   Social History Narrative    Not on file     Social Determinants of Health     Financial Resource Strain: Not on file   Food Insecurity: Not on file   Transportation Needs: Not on file   Physical Activity: Not on file   Stress: Not on file   Social Connections: Not on file   Intimate Partner Violence: Not on file   Housing Stability: Not on file      Family History   Problem Relation Age of Onset    Colon cancer Mother 77    Breast cancer Mother 44    Hypertension Mother     Heart disease Mother     Hyperlipidemia Mother     Hypertension Father     Heart disease Father     Hypertension Sister     No Known Problems Daughter     Stroke Maternal Grandmother     Colon cancer Maternal Grandfather     Stroke Maternal Grandfather     Cancer Paternal Grandmother     Colon cancer Paternal Grandfather     Hypertension Sister     No Known Problems Daughter     No Known Problems Maternal Aunt     Pancreatic cancer Paternal Aunt      Past Surgical History:   Procedure Laterality Date    COLONOSCOPY      12/9 done Dr Daniels / [place]...7/29/16    DIAGNOSTIC LAPAROSCOPY      Ovarian cystectomy    EXPLORATORY LAPAROTOMY  1986    Ovarian cystectomy / 1986    LAPAROSCOPY      Ovarian cystectomy    WISDOM TOOTH EXTRACTION         Current Outpatient Medications:     aspirin 81 MG tablet, Take 1 tablet by mouth daily, Disp: , Rfl:     Calcium Carbonate-Vitamin D 600-400 MG-UNIT per tablet, Take 1 tablet by mouth daily, Disp: , Rfl:     cyanocobalamin (VITAMIN B-12) 100 MCG tablet, Take 100 mcg by mouth daily, Disp: , Rfl:     lisinopril-hydrochlorothiazide (PRINZIDE,ZESTORETIC) 10-12.5 MG per tablet, TAKE ONE TABLET BY MOUTH EVERY DAY, Disp: 90 tablet, Rfl: 0    Omega-3 Fatty Acids (FISH OIL) 1,000 mg, Take 1 tablet by mouth daily, Disp: , Rfl:     pravastatin (PRAVACHOL) 40 mg tablet, Take 1 tablet (40 mg total) by mouth daily, Disp: 90 tablet, Rfl: 1  No Known  "Allergies      Imaging: No results found.    ECG: Normal sinus rhythm, left bundle branch block    Review of Systems:  Review of Systems   Respiratory:  Negative for choking and shortness of breath.    Cardiovascular:  Negative for chest pain, palpitations and leg swelling.   All other systems reviewed and are negative.        Vitals:    01/24/24 0947 01/24/24 1024   BP: 152/80 144/88   BP Location: Left arm    Patient Position: Sitting    Cuff Size: Large    Pulse: 58    Weight: 87.1 kg (192 lb)    Height: 5' 3\" (1.6 m)      Vitals:    01/24/24 0947   Weight: 87.1 kg (192 lb)     Height: 5' 3\" (160 cm)     Physical Exam:  General appearance:  Appears stated age, alert, well appearing and in no distress  HEENT:  PERRLA, EOMI, no scleral icterus, no conjunctival pallor  NECK:  Supple, No elevated JVP, no thyromegaly, no carotid bruits  HEART:  Regular rate and rhythm, normal S1/S2, no S3/S4, no murmur or rub  LUNGS:  Clear to auscultation bilaterally  ABDOMEN:  Soft, non-tender, positive bowel sounds, no rebound or guarding, no organomegaly   EXTREMITIES:  No edema  VASCULAR:  Normal pedal pulses   SKIN: No lesions or rashes on exposed skin  NEURO:  CN II-XII intact, no focal deficits  "

## 2024-03-04 ENCOUNTER — HOSPITAL ENCOUNTER (OUTPATIENT)
Dept: NON INVASIVE DIAGNOSTICS | Facility: CLINIC | Age: 64
Discharge: HOME | End: 2024-03-04
Payer: COMMERCIAL

## 2024-03-04 VITALS
HEART RATE: 58 BPM | DIASTOLIC BLOOD PRESSURE: 88 MMHG | SYSTOLIC BLOOD PRESSURE: 144 MMHG | BODY MASS INDEX: 34.02 KG/M2 | HEIGHT: 63 IN | WEIGHT: 192 LBS

## 2024-03-04 DIAGNOSIS — I44.7 LBBB (LEFT BUNDLE BRANCH BLOCK): Chronic | ICD-10-CM

## 2024-03-04 LAB
AORTIC ROOT: 3.3 CM
APICAL FOUR CHAMBER EJECTION FRACTION: 62 %
ASCENDING AORTA: 3.7 CM
BSA FOR ECHO PROCEDURE: 1.9 M2
E WAVE DECELERATION TIME: 238 MS
E/A RATIO: 0.7
FRACTIONAL SHORTENING: 25 (ref 28–44)
INTERVENTRICULAR SEPTUM IN DIASTOLE (PARASTERNAL SHORT AXIS VIEW): 1.2 CM
INTERVENTRICULAR SEPTUM: 1.2 CM (ref 0.6–1.1)
LAAS-AP2: 15.6 CM2
LAAS-AP4: 12.7 CM2
LEFT ATRIUM SIZE: 2.9 CM
LEFT ATRIUM VOLUME (MOD BIPLANE): 35 ML
LEFT ATRIUM VOLUME INDEX (MOD BIPLANE): 18.4 ML/M2
LEFT INTERNAL DIMENSION IN SYSTOLE: 2.7 CM (ref 2.1–4)
LEFT VENTRICULAR INTERNAL DIMENSION IN DIASTOLE: 3.6 CM (ref 3.5–6)
LEFT VENTRICULAR POSTERIOR WALL IN END DIASTOLE: 1.1 CM
LEFT VENTRICULAR STROKE VOLUME: 28 ML
LVSV (TEICH): 28 ML
MV E'TISSUE VEL-LAT: 10 CM/S
MV E'TISSUE VEL-SEP: 9 CM/S
MV PEAK A VEL: 0.86 M/S
MV PEAK E VEL: 60 CM/S
MV STENOSIS PRESSURE HALF TIME: 69 MS
MV VALVE AREA P 1/2 METHOD: 3.19
RA PRESSURE ESTIMATED: 3 MMHG
RIGHT ATRIUM AREA SYSTOLE A4C: 11.9 CM2
RIGHT VENTRICLE ID DIMENSION: 2.8 CM
RV PSP: 27 MMHG
SL CV LEFT ATRIUM LENGTH A2C: 5 CM
SL CV LV EF: 60
SL CV PED ECHO LEFT VENTRICLE DIASTOLIC VOLUME (MOD BIPLANE) 2D: 54 ML
SL CV PED ECHO LEFT VENTRICLE SYSTOLIC VOLUME (MOD BIPLANE) 2D: 27 ML
TR MAX PG: 24 MMHG
TR PEAK VELOCITY: 2.5 M/S
TRICUSPID ANNULAR PLANE SYSTOLIC EXCURSION: 2.1 CM
TRICUSPID VALVE PEAK REGURGITATION VELOCITY: 2.47 M/S

## 2024-03-04 PROCEDURE — 93306 TTE W/DOPPLER COMPLETE: CPT | Performed by: INTERNAL MEDICINE

## 2024-03-04 PROCEDURE — 93306 TTE W/DOPPLER COMPLETE: CPT

## 2024-03-08 ENCOUNTER — TELEPHONE (OUTPATIENT)
Dept: CARDIOLOGY CLINIC | Facility: CLINIC | Age: 64
End: 2024-03-08

## 2024-03-08 NOTE — TELEPHONE ENCOUNTER
Please see message from pt.    Hi my name is Estefania Galeas phone# [phone]. I had an Echo done on Monday March 4th and I received a call from Dr. Cintron on Wednesday March 6th stating that if I wanted to go over the results with her, that I should call the office and she would speak to me again.Thank you. Bye.

## 2024-05-13 ENCOUNTER — ESTABLISHED COMPREHENSIVE EXAM (OUTPATIENT)
Dept: URBAN - METROPOLITAN AREA CLINIC 6 | Facility: CLINIC | Age: 64
End: 2024-05-13

## 2024-05-13 DIAGNOSIS — I10 ESSENTIAL HYPERTENSION: ICD-10-CM

## 2024-05-13 DIAGNOSIS — H52.4: ICD-10-CM

## 2024-05-13 DIAGNOSIS — E78.5 HYPERLIPIDEMIA, UNSPECIFIED HYPERLIPIDEMIA TYPE: ICD-10-CM

## 2024-05-13 DIAGNOSIS — H04.123: ICD-10-CM

## 2024-05-13 DIAGNOSIS — H25.13: ICD-10-CM

## 2024-05-13 PROCEDURE — 92014 COMPRE OPH EXAM EST PT 1/>: CPT

## 2024-05-13 PROCEDURE — 92015 DETERMINE REFRACTIVE STATE: CPT

## 2024-05-13 ASSESSMENT — VISUAL ACUITY
OD_SC: 20/40-1
OS_PH: 20/30-1
OU_SC: J2
OD_PH: 20/30-2
OS_SC: 20/80-2

## 2024-05-13 ASSESSMENT — TONOMETRY
OS_IOP_MMHG: 20
OD_IOP_MMHG: 16

## 2024-05-14 RX ORDER — LISINOPRIL AND HYDROCHLOROTHIAZIDE 12.5; 1 MG/1; MG/1
TABLET ORAL
Qty: 90 TABLET | Refills: 1 | Status: SHIPPED | OUTPATIENT
Start: 2024-05-14

## 2024-05-14 RX ORDER — PRAVASTATIN SODIUM 40 MG
40 TABLET ORAL DAILY
Qty: 90 TABLET | Refills: 1 | Status: SHIPPED | OUTPATIENT
Start: 2024-05-14

## 2024-05-28 ENCOUNTER — HOSPITAL ENCOUNTER (OUTPATIENT)
Dept: RADIOLOGY | Age: 64
Discharge: HOME | End: 2024-05-28
Payer: COMMERCIAL

## 2024-05-28 VITALS — BODY MASS INDEX: 34.02 KG/M2 | WEIGHT: 192 LBS | HEIGHT: 63 IN

## 2024-05-28 DIAGNOSIS — Z12.31 VISIT FOR SCREENING MAMMOGRAM: ICD-10-CM

## 2024-05-28 PROCEDURE — 77067 SCR MAMMO BI INCL CAD: CPT

## 2024-05-28 PROCEDURE — 77063 BREAST TOMOSYNTHESIS BI: CPT

## 2024-06-10 ENCOUNTER — OFFICE VISIT (OUTPATIENT)
Dept: FAMILY MEDICINE CLINIC | Facility: CLINIC | Age: 64
End: 2024-06-10
Payer: COMMERCIAL

## 2024-06-10 ENCOUNTER — APPOINTMENT (OUTPATIENT)
Dept: LAB | Facility: CLINIC | Age: 64
End: 2024-06-10
Payer: COMMERCIAL

## 2024-06-10 VITALS
DIASTOLIC BLOOD PRESSURE: 70 MMHG | OXYGEN SATURATION: 96 % | HEART RATE: 86 BPM | SYSTOLIC BLOOD PRESSURE: 118 MMHG | WEIGHT: 196 LBS | HEIGHT: 63 IN | TEMPERATURE: 97.6 F | BODY MASS INDEX: 34.73 KG/M2 | RESPIRATION RATE: 16 BRPM

## 2024-06-10 DIAGNOSIS — I44.7 LBBB (LEFT BUNDLE BRANCH BLOCK): Chronic | ICD-10-CM

## 2024-06-10 DIAGNOSIS — Z78.0 MENOPAUSE: ICD-10-CM

## 2024-06-10 DIAGNOSIS — Z13.1 ENCOUNTER FOR SCREENING FOR DIABETES MELLITUS: ICD-10-CM

## 2024-06-10 DIAGNOSIS — E78.5 DYSLIPIDEMIA: ICD-10-CM

## 2024-06-10 DIAGNOSIS — E66.9 OBESITY (BMI 30.0-34.9): ICD-10-CM

## 2024-06-10 DIAGNOSIS — Z00.00 ENCOUNTER FOR WELLNESS EXAMINATION IN ADULT: Primary | ICD-10-CM

## 2024-06-10 DIAGNOSIS — I10 BENIGN ESSENTIAL HYPERTENSION: ICD-10-CM

## 2024-06-10 DIAGNOSIS — E78.5 HYPERLIPIDEMIA, UNSPECIFIED HYPERLIPIDEMIA TYPE: ICD-10-CM

## 2024-06-10 LAB
ALBUMIN SERPL BCP-MCNC: 4.3 G/DL (ref 3.5–5)
ALP SERPL-CCNC: 74 U/L (ref 34–104)
ALT SERPL W P-5'-P-CCNC: 17 U/L (ref 7–52)
ANION GAP SERPL CALCULATED.3IONS-SCNC: 12 MMOL/L (ref 4–13)
AST SERPL W P-5'-P-CCNC: 18 U/L (ref 13–39)
BASOPHILS # BLD AUTO: 0.04 THOUSANDS/ÂΜL (ref 0–0.1)
BASOPHILS NFR BLD AUTO: 1 % (ref 0–1)
BILIRUB DIRECT SERPL-MCNC: 0.07 MG/DL (ref 0–0.2)
BILIRUB SERPL-MCNC: 0.48 MG/DL (ref 0.2–1)
BUN SERPL-MCNC: 15 MG/DL (ref 5–25)
CALCIUM SERPL-MCNC: 9.8 MG/DL (ref 8.4–10.2)
CHLORIDE SERPL-SCNC: 101 MMOL/L (ref 96–108)
CHOLEST SERPL-MCNC: 194 MG/DL
CO2 SERPL-SCNC: 27 MMOL/L (ref 21–32)
CREAT SERPL-MCNC: 0.84 MG/DL (ref 0.6–1.3)
EOSINOPHIL # BLD AUTO: 0.16 THOUSAND/ÂΜL (ref 0–0.61)
EOSINOPHIL NFR BLD AUTO: 2 % (ref 0–6)
ERYTHROCYTE [DISTWIDTH] IN BLOOD BY AUTOMATED COUNT: 12.8 % (ref 11.6–15.1)
EST. AVERAGE GLUCOSE BLD GHB EST-MCNC: 114 MG/DL
GFR SERPL CREATININE-BSD FRML MDRD: 73 ML/MIN/1.73SQ M
GLUCOSE P FAST SERPL-MCNC: 85 MG/DL (ref 65–99)
HBA1C MFR BLD: 5.6 %
HCT VFR BLD AUTO: 44.1 % (ref 34.8–46.1)
HDLC SERPL-MCNC: 52 MG/DL
HGB BLD-MCNC: 14.7 G/DL (ref 11.5–15.4)
IMM GRANULOCYTES # BLD AUTO: 0.02 THOUSAND/UL (ref 0–0.2)
IMM GRANULOCYTES NFR BLD AUTO: 0 % (ref 0–2)
LDLC SERPL CALC-MCNC: 109 MG/DL (ref 0–100)
LYMPHOCYTES # BLD AUTO: 2.36 THOUSANDS/ÂΜL (ref 0.6–4.47)
LYMPHOCYTES NFR BLD AUTO: 34 % (ref 14–44)
MCH RBC QN AUTO: 29.6 PG (ref 26.8–34.3)
MCHC RBC AUTO-ENTMCNC: 33.3 G/DL (ref 31.4–37.4)
MCV RBC AUTO: 89 FL (ref 82–98)
MONOCYTES # BLD AUTO: 0.67 THOUSAND/ÂΜL (ref 0.17–1.22)
MONOCYTES NFR BLD AUTO: 10 % (ref 4–12)
NEUTROPHILS # BLD AUTO: 3.75 THOUSANDS/ÂΜL (ref 1.85–7.62)
NEUTS SEG NFR BLD AUTO: 53 % (ref 43–75)
NRBC BLD AUTO-RTO: 0 /100 WBCS
PLATELET # BLD AUTO: 297 THOUSANDS/UL (ref 149–390)
PMV BLD AUTO: 11.4 FL (ref 8.9–12.7)
POTASSIUM SERPL-SCNC: 4.2 MMOL/L (ref 3.5–5.3)
PROT SERPL-MCNC: 7.4 G/DL (ref 6.4–8.4)
RBC # BLD AUTO: 4.97 MILLION/UL (ref 3.81–5.12)
SODIUM SERPL-SCNC: 140 MMOL/L (ref 135–147)
TRIGL SERPL-MCNC: 164 MG/DL
TSH SERPL DL<=0.05 MIU/L-ACNC: 1.77 UIU/ML (ref 0.45–4.5)
WBC # BLD AUTO: 7 THOUSAND/UL (ref 4.31–10.16)

## 2024-06-10 PROCEDURE — 36415 COLL VENOUS BLD VENIPUNCTURE: CPT

## 2024-06-10 PROCEDURE — 82248 BILIRUBIN DIRECT: CPT

## 2024-06-10 PROCEDURE — 85025 COMPLETE CBC W/AUTO DIFF WBC: CPT

## 2024-06-10 PROCEDURE — 80053 COMPREHEN METABOLIC PANEL: CPT

## 2024-06-10 PROCEDURE — 80061 LIPID PANEL: CPT

## 2024-06-10 PROCEDURE — 84443 ASSAY THYROID STIM HORMONE: CPT

## 2024-06-10 PROCEDURE — 83036 HEMOGLOBIN GLYCOSYLATED A1C: CPT

## 2024-06-10 PROCEDURE — 99396 PREV VISIT EST AGE 40-64: CPT | Performed by: FAMILY MEDICINE

## 2024-06-10 NOTE — ASSESSMENT & PLAN NOTE
Currently on pravastatin 40 mg daily.  We will reassess blood work today.  Cardiology recommended stronger agent such as atorvastatin or rosuvastatin.  Patient has agreed to increase dose of pravastatin from 20 to 40 mg daily.

## 2024-06-10 NOTE — PROGRESS NOTES
Ambulatory Visit  Name: Estefania Douglas      : 1960      MRN: [id number]  Encounter Provider: Madina Davies MD  Encounter Date: 6/10/2024   Encounter department: [facility]    Assessment & Plan   1. Encounter for wellness examination in adult  2. Dyslipidemia  Assessment & Plan:  Currently on pravastatin 40 mg daily.  We will reassess blood work today.  Cardiology recommended stronger agent such as atorvastatin or rosuvastatin.  Patient has agreed to increase dose of pravastatin from 20 to 40 mg daily.  Orders:  -     CBC and differential; Future  -     Comprehensive metabolic panel; Future  -     Lipid Panel with Direct LDL reflex; Future  -     TSH, 3rd generation; Future  3. Benign essential hypertension  Assessment & Plan:  Blood pressure is well-controlled, continue lisinopril HCTZ  4. Encounter for screening for diabetes mellitus  -     Hemoglobin A1C; Future  5. Obesity (BMI 30.0-34.9)  -     Ambulatory referral to Weight Management; Future  6. Menopause  -     DXA bone density spine hip and pelvis; Future; Expected date: 06/10/2024  7. LBBB (left bundle branch block)  Assessment & Plan:  Negative nuclear stress test 2019.  Patient establish care with . [facility] cardiology.  She is asymptomatic    Patient Instructions   Please research low-sodium low-cholesterol diet  You will be due for follow-up colonoscopy in 2024, please contact Dr. Thomas  12 hour fasting blood work today    Return in about 1 year (around 6/10/2025) for Annual physical/well exam.         History of Present Illness     Annual well exam  Due for colonoscopy , last study 2019- , recommended  5 year  study due to family  history of colon CA.  Patient is asymptomatic  UTD with mammography   Up-to-date with GYN, follows with   Patient had recent evaluation by . [facility] cardiology due to history of left bundle branch block.  She had increased dose of pravastatin from  20 to 40 mg daily after her appointment with Dr. Cintron.    Patient offers no complaints of chest pain, palpitations, shortness of breath or dizziness.  She remains on daily Rx for hypertension and hyperlipidemia.        Review of Systems   Constitutional: Negative.    HENT: Negative.     Eyes: Negative.    Respiratory: Negative.     Cardiovascular: Negative.    Gastrointestinal: Negative.    Endocrine: Negative.    Genitourinary: Negative.    Musculoskeletal: Negative.    Allergic/Immunologic: Negative.    Neurological: Negative.    Hematological: Negative.    Psychiatric/Behavioral: Negative.       Past Medical History:   Diagnosis Date   • Anemia    • Hypercholesterolemia    • Hypertension     LA...7/28/17    • Hypertension    • Left bundle branch block      Past Surgical History:   Procedure Laterality Date   • COLONOSCOPY      12/9 done Dr Daniels / [place]...7/29/16   • DIAGNOSTIC LAPAROSCOPY      Ovarian cystectomy   • EXPLORATORY LAPAROTOMY  1986    Ovarian cystectomy / 1986   • LAPAROSCOPY      Ovarian cystectomy   • WISDOM TOOTH EXTRACTION       Family History   Problem Relation Age of Onset   • Colon cancer Mother 77   • Breast cancer Mother 44   • Hypertension Mother    • Heart disease Mother    • Hyperlipidemia Mother    • Hypertension Father    • Heart disease Father    • Hypertension Sister    • No Known Problems Daughter    • Stroke Maternal Grandmother    • Colon cancer Maternal Grandfather    • Stroke Maternal Grandfather    • Cancer Paternal Grandmother    • Colon cancer Paternal Grandfather    • Hypertension Sister    • No Known Problems Daughter    • No Known Problems Maternal Aunt    • Pancreatic cancer Paternal Aunt      Social History     Tobacco Use   • Smoking status: Never   • Smokeless tobacco: Never   Vaping Use   • Vaping status: Never Used   Substance and Sexual Activity   • Alcohol use: Yes     Comment: social   • Drug use: No   • Sexual activity: Yes     Partners: Male     Birth  "control/protection: Post-menopausal     Current Outpatient Medications on File Prior to Visit   Medication Sig   • aspirin 81 MG tablet Take 1 tablet by mouth daily   • Calcium Carbonate-Vitamin D 600-400 MG-UNIT per tablet Take 1 tablet by mouth daily   • cyanocobalamin (VITAMIN B-12) 100 MCG tablet Take 100 mcg by mouth daily   • lisinopril-hydrochlorothiazide (PRINZIDE,ZESTORETIC) 10-12.5 MG per tablet TAKE ONE TABLET BY MOUTH EVERY DAY   • Omega-3 Fatty Acids (FISH OIL) 1,000 mg Take 1 tablet by mouth daily   • pravastatin (PRAVACHOL) 40 mg tablet TAKE ONE TABLET BY MOUTH EVERY DAY     No Known Allergies  Immunization History   Administered Date(s) Administered   • COVID-19 MODERNA VACC 0.25 ML IM BOOSTER 12/10/2021   • COVID-19 MODERNA VACC 0.5 ML IM 12/30/2020, 01/25/2021   • H1N1, All Formulations 12/15/2009   • INFLUENZA 11/01/2018   • Influenza Quadrivalent Preservative Free 3 years and older IM 11/08/2016   • Influenza, recombinant, quadrivalent,injectable, preservative free 11/16/2020   • Influenza, seasonal, injectable 09/02/2015   • Zoster Vaccine Recombinant 06/08/2023, 08/09/2023     Objective     /70 (BP Location: Left arm, Patient Position: Sitting, Cuff Size: Large)   Pulse 86   Temp 97.6 °F (36.4 °C) (Temporal)   Resp 16   Ht 5' 3\" (1.6 m)   Wt 88.9 kg (196 lb)   SpO2 96%   BMI 34.72 kg/m²     Physical Exam  Vitals and nursing note reviewed.   Constitutional:       General: She is not in acute distress.     Appearance: Normal appearance. She is well-developed. She is not ill-appearing.   HENT:      Head: Normocephalic and atraumatic.   Eyes:      General: No scleral icterus.     Conjunctiva/sclera: Conjunctivae normal.   Neck:      Thyroid: No thyromegaly.      Vascular: No carotid bruit.   Cardiovascular:      Rate and Rhythm: Normal rate and regular rhythm.      Heart sounds: Normal heart sounds. No murmur heard.  Pulmonary:      Effort: Pulmonary effort is normal. No respiratory " distress.      Breath sounds: Normal breath sounds. No wheezing.   Abdominal:      General: Bowel sounds are normal. There is no distension or abdominal bruit.      Palpations: Abdomen is soft.      Tenderness: There is no abdominal tenderness.      Hernia: No hernia is present.   Musculoskeletal:         General: Normal range of motion.      Cervical back: Neck supple. No rigidity.      Right lower leg: No edema.      Left lower leg: No edema.   Skin:     General: Skin is warm.   Neurological:      General: No focal deficit present.      Mental Status: She is alert and oriented to person, place, and time.      Cranial Nerves: No cranial nerve deficit.      Coordination: Coordination normal.   Psychiatric:         Mood and Affect: Mood normal.         Behavior: Behavior normal.         Thought Content: Thought content normal.       Administrative Statements

## 2024-06-10 NOTE — PATIENT INSTRUCTIONS
Please research low-sodium low-cholesterol diet  You will be due for follow-up colonoscopy in September 2024, please contact Dr. Thomas  12 hour fasting blood work today

## 2024-06-12 NOTE — ASSESSMENT & PLAN NOTE
Negative nuclear stress test September 2019.  Patient establish care with [facility] cardiology.  She is asymptomatic

## 2024-07-29 ENCOUNTER — TELEPHONE (OUTPATIENT)
Age: 64
End: 2024-07-29

## 2024-07-29 ENCOUNTER — PREP FOR PROCEDURE (OUTPATIENT)
Age: 64
End: 2024-07-29

## 2024-07-29 DIAGNOSIS — Z12.11 SCREENING FOR COLON CANCER: Primary | ICD-10-CM

## 2024-07-29 NOTE — TELEPHONE ENCOUNTER
Scheduled date of colonoscopy (as of today): 10/01/24  Physician performing colonoscopy: ALICJA  Location of colonoscopy: [facility]  Bowel prep reviewed with patient: DESIREE/VICTORIA  Instructions reviewed with patient by: TO BE MAILED  Clearances: N/A    : [place] [phone]

## 2024-09-17 ENCOUNTER — TELEPHONE (OUTPATIENT)
Age: 64
End: 2024-09-17

## 2024-09-17 NOTE — TELEPHONE ENCOUNTER
Contacted patient, no answer, LMOM-  I am calling from Dr Thomas office. I am calling to remind you of your upcoming colonoscopy on 10/1/24, and confirm you have your bowel prep instructions. If advised to hold certain medications prior to procedure, please remember to do so.     You will still receive a confirmation call the day prior to your appointment from the facility with your arrival time.     If you need to reschedule or do not have your instructions please call our office at [phone].

## 2024-09-18 ENCOUNTER — TELEPHONE (OUTPATIENT)
Age: 64
End: 2024-09-18

## 2024-09-18 NOTE — TELEPHONE ENCOUNTER
Pt going for colonoscopy on 10/01/24. Patient takes baby aspirin, she's wondering if she should stop it for a few days before the colonoscopy .     Dr Raphael/ cardiologist... she has bundle branch block, was wondering if it affects the Colonoscopy procedure.

## 2024-09-18 NOTE — TELEPHONE ENCOUNTER
Called and spoke with patient and relay providers message/instructions. Patient verbalized understanding

## 2024-09-18 NOTE — TELEPHONE ENCOUNTER
Patients GI provider:  Dr. Thomas    Number to return call: [phone]    Reason for call: Pt calling to advise she has a bundle branch blockage, on no meds for that. Wanted doc to be aware of that to make sure there are no issues w/having proced. Please review w/Dr. Thomas and call pt back to advise.    Scheduled procedure/appointment date if applicable: procedure 10/01/2024

## 2024-09-18 NOTE — TELEPHONE ENCOUNTER
Patient should have had directions for colonoscopy prep and medication directions from Dr. Thomas.  Please ask her to review.  We usually hold aspirin 1 week prior to colonoscopy.  Thank you

## 2024-09-18 NOTE — TELEPHONE ENCOUNTER
Spoke to pt aware she is having done at [facility] and she is not on any bloodthinners

## 2024-09-19 ENCOUNTER — OFFICE VISIT (OUTPATIENT)
Dept: FAMILY MEDICINE CLINIC | Facility: CLINIC | Age: 64
End: 2024-09-19
Payer: COMMERCIAL

## 2024-09-19 VITALS
RESPIRATION RATE: 18 BRPM | WEIGHT: 195.25 LBS | HEIGHT: 63 IN | HEART RATE: 73 BPM | TEMPERATURE: 97.3 F | BODY MASS INDEX: 34.59 KG/M2 | SYSTOLIC BLOOD PRESSURE: 130 MMHG | DIASTOLIC BLOOD PRESSURE: 90 MMHG | OXYGEN SATURATION: 96 %

## 2024-09-19 DIAGNOSIS — J02.9 SORE THROAT: Primary | ICD-10-CM

## 2024-09-19 PROCEDURE — 99213 OFFICE O/P EST LOW 20 MIN: CPT | Performed by: FAMILY MEDICINE

## 2024-09-19 NOTE — ASSESSMENT & PLAN NOTE
Intermittent, not especially bothersome. Will allow time to resolve and follow up if she would like referral to ENT for further evaluation. Well appearing otherwise and no other symptoms of URI.

## 2024-09-19 NOTE — PROGRESS NOTES
"Ambulatory Visit  Name: Estefania Douglas      : 1960      MRN: [id number]  Encounter Provider: Umer Jones DO  Encounter Date: 2024   Encounter department: [facility]    Assessment & Plan  Sore throat  Intermittent, not especially bothersome. Will allow time to resolve and follow up if she would like referral to ENT for further evaluation. Well appearing otherwise and no other symptoms of URI.            History of Present Illness     Sore Throat       Intermittent sore throat for about a week. Feels like it is her esophagus. Today feels fine, R side of throat feels a little sore. No painful swallowing. Wondering if she should go to ENT. No problem eating. No prior GERD symptoms.       Review of Systems   HENT:  Positive for sore throat.            Objective     /90   Pulse 73   Temp (!) 97.3 °F (36.3 °C)   Resp 18   Ht 5' 3\" (1.6 m)   Wt 88.6 kg (195 lb 4 oz)   SpO2 96%   BMI 34.59 kg/m²     Physical Exam  Vitals reviewed.   Constitutional:       Appearance: Normal appearance.   HENT:      Right Ear: External ear normal.      Left Ear: External ear normal.      Nose: Nose normal.      Mouth/Throat:      Mouth: Mucous membranes are moist.      Pharynx: Oropharynx is clear.   Eyes:      Extraocular Movements: Extraocular movements intact.      Conjunctiva/sclera: Conjunctivae normal.   Cardiovascular:      Rate and Rhythm: Normal rate.   Pulmonary:      Effort: Pulmonary effort is normal.   Abdominal:      General: Abdomen is flat.   Neurological:      Mental Status: She is alert.         "

## 2024-10-01 ENCOUNTER — ANESTHESIA (OUTPATIENT)
Dept: GASTROENTEROLOGY | Facility: HOSPITAL | Age: 64
End: 2024-10-01
Payer: COMMERCIAL

## 2024-10-01 ENCOUNTER — ANESTHESIA EVENT (OUTPATIENT)
Dept: GASTROENTEROLOGY | Facility: HOSPITAL | Age: 64
End: 2024-10-01
Payer: COMMERCIAL

## 2024-10-01 ENCOUNTER — HOSPITAL ENCOUNTER (OUTPATIENT)
Dept: GASTROENTEROLOGY | Facility: HOSPITAL | Age: 64
Setting detail: OUTPATIENT SURGERY
Discharge: HOME | End: 2024-10-01
Attending: COLON & RECTAL SURGERY
Payer: COMMERCIAL

## 2024-10-01 VITALS
DIASTOLIC BLOOD PRESSURE: 74 MMHG | BODY MASS INDEX: 34.55 KG/M2 | SYSTOLIC BLOOD PRESSURE: 129 MMHG | HEART RATE: 84 BPM | RESPIRATION RATE: 20 BRPM | HEIGHT: 63 IN | TEMPERATURE: 97.2 F | WEIGHT: 195 LBS | OXYGEN SATURATION: 97 %

## 2024-10-01 DIAGNOSIS — Z12.11 SCREENING FOR COLON CANCER: ICD-10-CM

## 2024-10-01 PROCEDURE — G0105 COLORECTAL SCRN; HI RISK IND: HCPCS | Performed by: COLON & RECTAL SURGERY

## 2024-10-01 RX ORDER — PROPOFOL 10 MG/ML
INJECTION, EMULSION INTRAVENOUS AS NEEDED
Status: DISCONTINUED | OUTPATIENT
Start: 2024-10-01 | End: 2024-10-01

## 2024-10-01 RX ORDER — SODIUM CHLORIDE 9 MG/ML
INJECTION, SOLUTION INTRAVENOUS CONTINUOUS PRN
Status: DISCONTINUED | OUTPATIENT
Start: 2024-10-01 | End: 2024-10-01

## 2024-10-01 RX ADMIN — PROPOFOL 50 MG: 10 INJECTION, EMULSION INTRAVENOUS at 07:44

## 2024-10-01 RX ADMIN — SODIUM CHLORIDE: 9 INJECTION, SOLUTION INTRAVENOUS at 07:36

## 2024-10-01 RX ADMIN — PROPOFOL 50 MG: 10 INJECTION, EMULSION INTRAVENOUS at 07:16

## 2024-10-01 RX ADMIN — PROPOFOL 50 MG: 10 INJECTION, EMULSION INTRAVENOUS at 07:40

## 2024-10-01 RX ADMIN — PROPOFOL 100 MG: 10 INJECTION, EMULSION INTRAVENOUS at 07:38

## 2024-10-01 NOTE — ANESTHESIA PREPROCEDURE EVALUATION
"Procedure:  COLONOSCOPY    Review of Systems/Medical History  Patient summary reviewed.  Chart reviewed.      Cardiovascular  EKG reviewed. Exercise tolerance (METS): >4 METS. Hyperlipidemia, Hypertension  Dysrhythmias   Comment: NM Stress Test (9/2019)  IMPRESSIONS: Normal study after pharmacologic vasodilation. Myocardial perfusion imaging was normal at rest and with stress. Left ventricular systolic function was normal. Pulmonary  Negative pulmonary ROS        GI/Hepatic  Negative GI/hepatic ROS          Negative  ROS        Endo/Other     GYN       Hematology  Negative hematology ROS ,     Musculoskeletal       Neurology  Negative neurology ROS ,     Psychology             Physical Exam    Airway    Mallampati score: I  TM Distance: >3 FB  Neck ROM: full     Dental   Comment: Crowns \"all over\"     Cardiovascular  Rhythm: regular, Rate: normal    Pulmonary   Breath sounds clear to auscultation    Other Findings  post-pubertal.      Anesthesia Plan  ASA Score- 2     Anesthesia Type- IV sedation with anesthesia with ASA Monitors.         Additional Monitors:     Airway Plan:            Plan Factors-Exercise tolerance (METS): >4 METS.    Chart reviewed. EKG reviewed.  Existing labs reviewed. Patient summary reviewed.    Patient is not a current smoker.              Induction- intravenous.    Postoperative Plan-     Perioperative Resuscitation Plan - Level 1 - Full Code.       Informed Consent- Anesthetic plan and risks discussed with patient.  I personally reviewed this patient with the CRNA. Discussed and agreed on the Anesthesia Plan with the CRNA..    "

## 2024-10-01 NOTE — ANESTHESIA POSTPROCEDURE EVALUATION
Post-Op Assessment Note    CV Status:  Stable    Pain management: adequate       Mental Status:  Sleepy   Hydration Status:  Euvolemic   PONV Controlled:  Controlled   Airway Patency:  Patent     Post Op Vitals Reviewed: Yes    No anethesia notable event occurred.    Staff: Anesthesiologist, CRNA               /68 (10/01/24 0752)    Temp (!) 97.2 °F (36.2 °C) (10/01/24 0752)    Pulse 74 (10/01/24 0752)   Resp 20 (10/01/24 0752)    SpO2 97 % (10/01/24 0752)

## 2024-10-01 NOTE — H&P
History and Physical   Colon and Rectal Surgery   Estefania Douglas 64 y.o. female MRN: [id number]  Unit/Bed#:  Encounter: [id number]  10/01/24   @[facility]    No chief complaint on file.        History of Present Illness   HPI:  Estefania Douglas is a 64 y.o. female who presents for screening for family history of colorectal cancer.      Historical Information   Past Medical History:   Diagnosis Date    Anemia     Hypercholesterolemia     Hypertension     LA...7/28/17     Hypertension     Left bundle branch block      Past Surgical History:   Procedure Laterality Date    COLONOSCOPY      12/9 done Dr Dnaiels / [place]...7/29/16    DIAGNOSTIC LAPAROSCOPY      Ovarian cystectomy    EXPLORATORY LAPAROTOMY  1986    Ovarian cystectomy / 1986    LAPAROSCOPY      Ovarian cystectomy    WISDOM TOOTH EXTRACTION         Meds/Allergies     Not in a hospital admission.      Current Outpatient Medications:     aspirin 81 MG tablet, Take 1 tablet by mouth daily, Disp: , Rfl:     Calcium Carbonate-Vitamin D 600-400 MG-UNIT per tablet, Take 1 tablet by mouth daily, Disp: , Rfl:     cyanocobalamin (VITAMIN B-12) 100 MCG tablet, Take 100 mcg by mouth daily, Disp: , Rfl:     lisinopril-hydrochlorothiazide (PRINZIDE,ZESTORETIC) 10-12.5 MG per tablet, TAKE ONE TABLET BY MOUTH EVERY DAY, Disp: 90 tablet, Rfl: 1    Omega-3 Fatty Acids (FISH OIL) 1,000 mg, Take 1 tablet by mouth daily, Disp: , Rfl:     pravastatin (PRAVACHOL) 40 mg tablet, TAKE ONE TABLET BY MOUTH EVERY DAY, Disp: 90 tablet, Rfl: 1    No Known Allergies      Social History   Social History     Substance and Sexual Activity   Alcohol Use Yes    Comment: social     Social History     Substance and Sexual Activity   Drug Use No     Social History     Tobacco Use   Smoking Status Never   Smokeless Tobacco Never         Family History:   Family History   Problem Relation Age of Onset    Colon cancer Mother 77    Breast cancer Mother 44    Hypertension Mother     Heart disease Mother      "Hyperlipidemia Mother     Hypertension Father     Heart disease Father     Hypertension Sister     No Known Problems Daughter     Stroke Maternal Grandmother     Colon cancer Maternal Grandfather     Stroke Maternal Grandfather     Cancer Paternal Grandmother     Colon cancer Paternal Grandfather     Hypertension Sister     No Known Problems Daughter     No Known Problems Maternal Aunt     Pancreatic cancer Paternal Aunt          Objective     Current Vitals:   Blood Pressure: 167/77 (10/01/24 0714)  Pulse: 88 (10/01/24 0714)  Temperature: 99.5 °F (37.5 °C) (10/01/24 0714)  Temp Source: Tympanic (10/01/24 0714)  Respirations: 16 (10/01/24 0714)  Height: 5' 3\" (160 cm) (10/01/24 0714)  Weight - Scale: 88.5 kg (195 lb) (10/01/24 0714)  SpO2: 96 % (10/01/24 0714)  No intake or output data in the 24 hours ending 10/01/24 0724    Physical Exam:  General:  Resting comfortably in bed   Eyes:Sclera anicteric  ENT: Trachea midline  Pulm:  Symmetric chest raise.  No respiratory Distress  CV:  Regular on monitor  Abdomen:  Soft NT ND  Extremities:  No clubbing/ cyanosis/ edema    Lab Results: I have personally reviewed pertinent lab results.    Imaging: No pertinent imaging studies reviewed.      ASSESSMENT:  Estefania Douglas is a 64 y.o. female who presents for outpatient colonoscopy.      PLAN:  For colonoscopy    Risks/ Benefits reviewed to include but not limited to anesthesia, bleeding, missed lesions, and colonoscopic perforation requiring surgery.      "

## 2024-11-12 DIAGNOSIS — I10 ESSENTIAL HYPERTENSION: ICD-10-CM

## 2024-11-13 RX ORDER — LISINOPRIL AND HYDROCHLOROTHIAZIDE 10; 12.5 MG/1; MG/1
TABLET ORAL
Qty: 90 TABLET | Refills: 1 | Status: SHIPPED | OUTPATIENT
Start: 2024-11-13

## 2024-11-14 DIAGNOSIS — E78.5 HYPERLIPIDEMIA, UNSPECIFIED HYPERLIPIDEMIA TYPE: ICD-10-CM

## 2024-11-14 RX ORDER — PRAVASTATIN SODIUM 40 MG
40 TABLET ORAL DAILY
Qty: 90 TABLET | Refills: 1 | Status: SHIPPED | OUTPATIENT
Start: 2024-11-14

## 2025-02-04 ENCOUNTER — OFFICE VISIT (OUTPATIENT)
Dept: FAMILY MEDICINE CLINIC | Facility: CLINIC | Age: 65
End: 2025-02-04
Payer: COMMERCIAL

## 2025-02-04 VITALS
WEIGHT: 193 LBS | SYSTOLIC BLOOD PRESSURE: 152 MMHG | BODY MASS INDEX: 34.2 KG/M2 | RESPIRATION RATE: 18 BRPM | HEIGHT: 63 IN | OXYGEN SATURATION: 97 % | DIASTOLIC BLOOD PRESSURE: 90 MMHG | HEART RATE: 100 BPM | TEMPERATURE: 96.6 F

## 2025-02-04 DIAGNOSIS — J02.9 SORE THROAT: Primary | ICD-10-CM

## 2025-02-04 PROCEDURE — 99213 OFFICE O/P EST LOW 20 MIN: CPT | Performed by: FAMILY MEDICINE

## 2025-02-04 RX ORDER — AZITHROMYCIN 250 MG/1
TABLET, FILM COATED ORAL
Qty: 6 TABLET | Refills: 0 | Status: SHIPPED | OUTPATIENT
Start: 2025-02-04 | End: 2025-02-09

## 2025-02-04 RX ORDER — PREDNISONE 20 MG/1
TABLET ORAL
Qty: 10 TABLET | Refills: 0 | Status: SHIPPED | OUTPATIENT
Start: 2025-02-04

## 2025-02-04 NOTE — PROGRESS NOTES
FAMILY PRACTICE OFFICE VISIT       NAME: Estefania Douglas  AGE: 64 y.o. SEX: female       : 1960        MRN: [id number]    DATE: 2025  TIME: 2:05 PM    Assessment and Plan     Problem List Items Addressed This Visit       Sore throat - Primary    Pharyngitis.  Patient with pharyngitis versus URI.  She was given prescription for Zithromax Z-Deyvi to take as directed for 5 days.  She was also given prednisone tapering dose consisting of 40 mg x 3 days, 20 mg x 4 days.  Patient will call if symptoms persist after medication completed         Relevant Medications    azithromycin (Zithromax) 250 mg tablet    predniSONE 20 mg tablet           Chief Complaint     Chief Complaint   Patient presents with    Sore Throat    Chills       History of Present Illness     Patient states since 2024 she has been having episodic episodes of coughing spells with headache, sore throat and temperature as high as 100.5.  Many of the symptoms had resolved however last week she developed more consistent coughing with production of mucus.  She denies any fevers recently.  She is unaware if she may have had COVID in the past month.  She does take care of her grandchildren which often bring home viruses from school.  Patient is a non-smoker.  She has tried over-the-counter Tylenol and Mucinex but does not like to take a lot of medication    Sore Throat   Associated symptoms include congestion, coughing and headaches.       Review of Systems   Review of Systems   Constitutional:  Positive for fever.   HENT:  Positive for congestion, sinus pressure and sore throat.    Respiratory:  Positive for cough.    Cardiovascular: Negative.    Gastrointestinal: Negative.    Musculoskeletal: Negative.    Neurological:  Positive for headaches.       Active Problem List     Patient Active Problem List   Diagnosis    Benign essential hypertension    Dyslipidemia    LBBB (left bundle branch block)    Obesity (BMI 30.0-34.9)    Sore throat        Past Medical History:  Past Medical History:   Diagnosis Date    Anemia     Hypercholesterolemia     Hypertension     LA...7/28/17     Hypertension     Left bundle branch block        Past Surgical History:  Past Surgical History:   Procedure Laterality Date    COLONOSCOPY      12/9 done Dr Daniels / [place]...7/29/16    DIAGNOSTIC LAPAROSCOPY      Ovarian cystectomy    EXPLORATORY LAPAROTOMY  1986    Ovarian cystectomy / 1986    LAPAROSCOPY      Ovarian cystectomy    WISDOM TOOTH EXTRACTION         Family History:  Family History   Problem Relation Age of Onset    Colon cancer Mother 77    Breast cancer Mother 44    Hypertension Mother     Heart disease Mother     Hyperlipidemia Mother     Hypertension Father     Heart disease Father     Hypertension Sister     No Known Problems Daughter     Stroke Maternal Grandmother     Colon cancer Maternal Grandfather     Stroke Maternal Grandfather     Cancer Paternal Grandmother     Colon cancer Paternal Grandfather     Hypertension Sister     No Known Problems Daughter     No Known Problems Maternal Aunt     Pancreatic cancer Paternal Aunt        Social History:  Social History     Socioeconomic History    Marital status: /Civil Union     Spouse name: Not on file    Number of children: Not on file    Years of education: Not on file    Highest education level: Not on file   Occupational History    Not on file   Tobacco Use    Smoking status: Never    Smokeless tobacco: Never   Vaping Use    Vaping status: Never Used   Substance and Sexual Activity    Alcohol use: Yes     Comment: social    Drug use: No    Sexual activity: Yes     Partners: Male     Birth control/protection: Post-menopausal   Other Topics Concern    Not on file   Social History Narrative    Not on file     Social Drivers of Health     Financial Resource Strain: Not on file   Food Insecurity: Not on file   Transportation Needs: Not on file   Physical Activity: Not on file   Stress: Not on file    Social Connections: Not on file   Intimate Partner Violence: Not on file   Housing Stability: Not on file       Objective     Vitals:    02/04/25 1340   BP: 152/90   Pulse: 100   Resp: 18   Temp: (!) 96.6 °F (35.9 °C)   SpO2: 97%     Wt Readings from Last 3 Encounters:   02/04/25 87.5 kg (193 lb)   10/01/24 88.5 kg (195 lb)   09/19/24 88.6 kg (195 lb 4 oz)       Physical Exam  Constitutional:       General: She is not in acute distress.     Appearance: Normal appearance. She is not ill-appearing.   HENT:      Head: Normocephalic and atraumatic.   Eyes:      General:         Right eye: No discharge.         Left eye: No discharge.      Extraocular Movements: Extraocular movements intact.      Conjunctiva/sclera: Conjunctivae normal.      Pupils: Pupils are equal, round, and reactive to light.   Neck:      Vascular: No carotid bruit.   Cardiovascular:      Rate and Rhythm: Normal rate and regular rhythm.      Heart sounds: Normal heart sounds. No murmur heard.  Pulmonary:      Effort: Pulmonary effort is normal.      Breath sounds: Normal breath sounds. No wheezing, rhonchi or rales.      Comments: Harsh cough with productive mucus  Musculoskeletal:      Right lower leg: No edema.      Left lower leg: No edema.   Lymphadenopathy:      Cervical: No cervical adenopathy.   Skin:     Findings: No rash.   Neurological:      General: No focal deficit present.      Mental Status: She is alert and oriented to person, place, and time.      Cranial Nerves: No cranial nerve deficit.   Psychiatric:         Mood and Affect: Mood normal.         Behavior: Behavior normal.         Thought Content: Thought content normal.         Judgment: Judgment normal.         Pertinent Laboratory/Diagnostic Studies:  Lab Results   Component Value Date    GLUCOSE 81 07/28/2015    BUN 15 06/10/2024    CREATININE 0.84 06/10/2024    CALCIUM 9.8 06/10/2024     07/28/2015    K 4.2 06/10/2024    CO2 27 06/10/2024     06/10/2024     Lab  "Results   Component Value Date    ALT 17 06/10/2024    AST 18 06/10/2024    ALKPHOS 74 06/10/2024    BILITOT 0.43 07/28/2015       Lab Results   Component Value Date    WBC 7.00 06/10/2024    HGB 14.7 06/10/2024    HCT 44.1 06/10/2024    MCV 89 06/10/2024     06/10/2024       No results found for: \"TSH\"    Lab Results   Component Value Date    CHOL 196 07/28/2015     Lab Results   Component Value Date    TRIG 164 (H) 06/10/2024     Lab Results   Component Value Date    HDL 52 06/10/2024     Lab Results   Component Value Date    LDLCALC 109 (H) 06/10/2024     Lab Results   Component Value Date    HGBA1C 5.6 06/10/2024       Results for orders placed or performed in visit on 06/10/24   CBC and differential    Collection Time: 06/10/24 11:38 AM   Result Value Ref Range    WBC 7.00 4.31 - 10.16 Thousand/uL    RBC 4.97 3.81 - 5.12 Million/uL    Hemoglobin 14.7 11.5 - 15.4 g/dL    Hematocrit 44.1 34.8 - 46.1 %    MCV 89 82 - 98 fL    MCH 29.6 26.8 - 34.3 pg    MCHC 33.3 31.4 - 37.4 g/dL    RDW 12.8 11.6 - 15.1 %    MPV 11.4 8.9 - 12.7 fL    Platelets 297 149 - 390 Thousands/uL    nRBC 0 /100 WBCs    Segmented % 53 43 - 75 %    Immature Grans % 0 0 - 2 %    Lymphocytes % 34 14 - 44 %    Monocytes % 10 4 - 12 %    Eosinophils Relative 2 0 - 6 %    Basophils Relative 1 0 - 1 %    Absolute Neutrophils 3.75 1.85 - 7.62 Thousands/µL    Absolute Immature Grans 0.02 0.00 - 0.20 Thousand/uL    Absolute Lymphocytes 2.36 0.60 - 4.47 Thousands/µL    Absolute Monocytes 0.67 0.17 - 1.22 Thousand/µL    Eosinophils Absolute 0.16 0.00 - 0.61 Thousand/µL    Basophils Absolute 0.04 0.00 - 0.10 Thousands/µL   Comprehensive metabolic panel    Collection Time: 06/10/24 11:38 AM   Result Value Ref Range    Sodium 140 135 - 147 mmol/L    Potassium 4.2 3.5 - 5.3 mmol/L    Chloride 101 96 - 108 mmol/L    CO2 27 21 - 32 mmol/L    ANION GAP 12 4 - 13 mmol/L    BUN 15 5 - 25 mg/dL    Creatinine 0.84 0.60 - 1.30 mg/dL    Glucose, Fasting 85 65 " - 99 mg/dL    Calcium 9.8 8.4 - 10.2 mg/dL    AST 18 13 - 39 U/L    ALT 17 7 - 52 U/L    Alkaline Phosphatase 74 34 - 104 U/L    Total Protein 7.4 6.4 - 8.4 g/dL    Albumin 4.3 3.5 - 5.0 g/dL    Total Bilirubin 0.48 0.20 - 1.00 mg/dL    eGFR 73 ml/min/1.73sq m   Hemoglobin A1C    Collection Time: 06/10/24 11:38 AM   Result Value Ref Range    Hemoglobin A1C 5.6 Normal 4.0-5.6%; PreDiabetic 5.7-6.4%; Diabetic >=6.5%; Glycemic control for adults with diabetes <7.0% %     mg/dl   Lipid Panel with Direct LDL reflex    Collection Time: 06/10/24 11:38 AM   Result Value Ref Range    Cholesterol 194 See Comment mg/dL    Triglycerides 164 (H) See Comment mg/dL    HDL, Direct 52 >=50 mg/dL    LDL Calculated 109 (H) 0 - 100 mg/dL   TSH, 3rd generation    Collection Time: 06/10/24 11:38 AM   Result Value Ref Range    TSH 3RD GENERATON 1.773 0.450 - 4.500 uIU/mL   Bilirubin, direct    Collection Time: 06/10/24 11:38 AM   Result Value Ref Range    Bilirubin, Direct 0.07 0.00 - 0.20 mg/dL       No orders of the defined types were placed in this encounter.      ALLERGIES:  No Known Allergies    Current Medications     Current Outpatient Medications   Medication Sig Dispense Refill    aspirin 81 MG tablet Take 1 tablet by mouth daily      azithromycin (Zithromax) 250 mg tablet Take 2 tablets (500 mg total) by mouth daily for 1 day, THEN 1 tablet (250 mg total) daily for 4 days. 6 tablet 0    Calcium Carbonate-Vitamin D 600-400 MG-UNIT per tablet Take 1 tablet by mouth daily      cyanocobalamin (VITAMIN B-12) 100 MCG tablet Take 100 mcg by mouth daily      lisinopril-hydrochlorothiazide (PRINZIDE,ZESTORETIC) 10-12.5 MG per tablet TAKE ONE TABLET BY MOUTH EVERY DAY 90 tablet 1    Omega-3 Fatty Acids (FISH OIL) 1,000 mg Take 1 tablet by mouth daily      pravastatin (PRAVACHOL) 40 mg tablet TAKE ONE TABLET BY MOUTH EVERY DAY 90 tablet 1    predniSONE 20 mg tablet 2 tabs daily X 3 days, 1 tab daily X 4 days 10 tablet 0     No  current facility-administered medications for this visit.         Health Maintenance     Health Maintenance   Topic Date Due    HIV Screening  Never done    DTaP,Tdap,and Td Vaccines (1 - Tdap) Never done    Osteoporosis Screening  Never done    Pneumococcal Vaccine: 65+ Years (1 of 1 - PCV) Never done    Influenza Vaccine (1) 09/01/2024    COVID-19 Vaccine (4 - 2024-25 season) 09/01/2024    Annual Physical  06/10/2025    Fall Risk  09/19/2025    Depression Screening  09/19/2025    Urinary Incontinence Screening  09/19/2025    Breast Cancer Screening: Mammogram  05/28/2026    Colorectal Cancer Screening  09/30/2029    RSV Vaccine for Pregnant Patients and Patients Age 60+ Years (1 - 1-dose 75+ series) 08/29/2034    Hepatitis C Screening  Completed    Zoster Vaccine  Completed    Meningococcal B Vaccine  Aged Out    RSV Vaccine age 0-20 Months  Aged Out    HIB Vaccine  Aged Out    IPV Vaccine  Aged Out    Hepatitis A Vaccine  Aged Out    Meningococcal ACWY Vaccine  Aged Out    HPV Vaccine  Aged Out     Immunization History   Administered Date(s) Administered    COVID-19 MODERNA VACC 0.25 ML IM BOOSTER 12/10/2021    COVID-19 MODERNA VACC 0.5 ML IM 12/30/2020, 01/25/2021    H1N1, All Formulations 12/15/2009    INFLUENZA 11/01/2018    Influenza Quadrivalent Preservative Free 3 years and older IM 11/08/2016    Influenza, recombinant, quadrivalent,injectable, preservative free 11/16/2020    Influenza, seasonal, injectable 09/02/2015    Zoster Vaccine Recombinant 06/08/2023, 08/09/2023       Tahir Joel MD

## 2025-02-04 NOTE — ASSESSMENT & PLAN NOTE
Pharyngitis.  Patient with pharyngitis versus URI.  She was given prescription for Zithromax Z-Deyvi to take as directed for 5 days.  She was also given prednisone tapering dose consisting of 40 mg x 3 days, 20 mg x 4 days.  Patient will call if symptoms persist after medication completed

## 2025-03-25 ENCOUNTER — OFFICE VISIT (OUTPATIENT)
Dept: CARDIOLOGY CLINIC | Facility: CLINIC | Age: 65
End: 2025-03-25
Payer: COMMERCIAL

## 2025-03-25 VITALS
HEART RATE: 60 BPM | WEIGHT: 194.4 LBS | SYSTOLIC BLOOD PRESSURE: 122 MMHG | HEIGHT: 63 IN | DIASTOLIC BLOOD PRESSURE: 82 MMHG | BODY MASS INDEX: 34.45 KG/M2

## 2025-03-25 DIAGNOSIS — E66.811 OBESITY (BMI 30.0-34.9): ICD-10-CM

## 2025-03-25 DIAGNOSIS — I44.7 LBBB (LEFT BUNDLE BRANCH BLOCK): Primary | Chronic | ICD-10-CM

## 2025-03-25 DIAGNOSIS — I10 BENIGN ESSENTIAL HYPERTENSION: ICD-10-CM

## 2025-03-25 DIAGNOSIS — E78.5 DYSLIPIDEMIA: ICD-10-CM

## 2025-03-25 PROCEDURE — 99214 OFFICE O/P EST MOD 30 MIN: CPT | Performed by: INTERNAL MEDICINE

## 2025-03-25 PROCEDURE — 93000 ELECTROCARDIOGRAM COMPLETE: CPT | Performed by: INTERNAL MEDICINE

## 2025-03-25 NOTE — PROGRESS NOTES
Cardiology Follow-up    Estefania Douglas  [id number]  1960  HE[facility]ASCULAR [facility]  [address] [place] [id number]      1. LBBB (left bundle branch block)  POCT ECG      2. Benign essential hypertension        3. Dyslipidemia        4. Obesity (BMI 30.0-34.9)            Discussion/Summary:  Mrs. Douglas is a pleasant 64-year-old female who presents to the office today for routine follow-up.  Since her last visit about a year ago she has been feeling well.  She offers no complaints.    She is sedentary but asymptomatic.  She had a stress test done in 2019 which was without ischemia or scar.  No further ischemic evaluation is recommended.  Given the left bundle branch block I had asked that she undergo a repeat echocardiogram which she had done after her last visit revealing LV function remains preserved.     Her blood pressure is controlled in the office today.  No changes were made to her regimen.  A low-sodium diet was reinforced.    Otherwise her most recent lipids do reveal suboptimal numbers despite increasing the pravastatin to 40 mg daily.  She is due to have repeat blood work through her primary care provider.  I will await the results.  If her LDL remains suboptimal I advise transition from pravastatin to either atorvastatin or rosuvastatin.     She will follow up in the office in one year.      History of Present Illness:  Mrs. Douglas is a pleasant 63-year-old female who presents to the office for routine follow-up.     She leads a sedentary lifestyle.  With the activity she is able to perform including carrying a laundry basket up a flight of steps she denies any cardiopulmonary symptoms of chest pain or shortness of breath.  She denies any signs or symptoms of congestive heart failure including increasing lower extremity edema, paroxysmal nocturnal dyspnea, orthopnea, acute weight gain or increasing abdominal  girth.  She denies lightheadedness, syncope or presyncope.  She denies palpitations or symptoms of claudication.      After her last visit with me she increased her pravastatin from 20 to 40 mg daily which she is tolerating without side effects.    Patient Active Problem List   Diagnosis    Benign essential hypertension    Dyslipidemia    LBBB (left bundle branch block)    Obesity (BMI 30.0-34.9)    Sore throat     Past Medical History:   Diagnosis Date    Anemia     Hypercholesterolemia     Hypertension     LA...7/28/17     Hypertension     Left bundle branch block      Social History     Socioeconomic History    Marital status: /Civil Union     Spouse name: Not on file    Number of children: Not on file    Years of education: Not on file    Highest education level: Not on file   Occupational History    Not on file   Tobacco Use    Smoking status: Never    Smokeless tobacco: Never   Vaping Use    Vaping status: Never Used   Substance and Sexual Activity    Alcohol use: Yes     Comment: social    Drug use: No    Sexual activity: Yes     Partners: Male     Birth control/protection: Post-menopausal   Other Topics Concern    Not on file   Social History Narrative    Not on file     Social Drivers of Health     Financial Resource Strain: Not on file   Food Insecurity: Not on file   Transportation Needs: Not on file   Physical Activity: Not on file   Stress: Not on file   Social Connections: Not on file   Intimate Partner Violence: Not on file   Housing Stability: Not on file      Family History   Problem Relation Age of Onset    Colon cancer Mother 77    Breast cancer Mother 44    Hypertension Mother     Heart disease Mother     Hyperlipidemia Mother     Hypertension Father     Heart disease Father     Hypertension Sister     No Known Problems Daughter     Stroke Maternal Grandmother     Colon cancer Maternal Grandfather     Stroke Maternal Grandfather     Cancer Paternal Grandmother     Colon cancer Paternal  "Grandfather     Hypertension Sister     No Known Problems Daughter     No Known Problems Maternal Aunt     Pancreatic cancer Paternal Aunt      Past Surgical History:   Procedure Laterality Date    COLONOSCOPY      12/9 done Dr Daniels / [place]...7/29/16    DIAGNOSTIC LAPAROSCOPY      Ovarian cystectomy    EXPLORATORY LAPAROTOMY  1986    Ovarian cystectomy / 1986    LAPAROSCOPY      Ovarian cystectomy    WISDOM TOOTH EXTRACTION         Current Outpatient Medications:     aspirin 81 MG tablet, Take 1 tablet by mouth daily, Disp: , Rfl:     Calcium Carbonate-Vitamin D 600-400 MG-UNIT per tablet, Take 1 tablet by mouth daily, Disp: , Rfl:     cyanocobalamin (VITAMIN B-12) 100 MCG tablet, Take 100 mcg by mouth daily, Disp: , Rfl:     lisinopril-hydrochlorothiazide (PRINZIDE,ZESTORETIC) 10-12.5 MG per tablet, TAKE ONE TABLET BY MOUTH EVERY DAY, Disp: 90 tablet, Rfl: 1    Omega-3 Fatty Acids (FISH OIL) 1,000 mg, Take 1 tablet by mouth daily, Disp: , Rfl:     pravastatin (PRAVACHOL) 40 mg tablet, TAKE ONE TABLET BY MOUTH EVERY DAY, Disp: 90 tablet, Rfl: 1    predniSONE 20 mg tablet, 2 tabs daily X 3 days, 1 tab daily X 4 days, Disp: 10 tablet, Rfl: 0  No Known Allergies      Imaging: No results found.    ECG: Normal sinus rhythm, left bundle branch block    Review of Systems:  Review of Systems   Respiratory:  Negative for choking and shortness of breath.    Cardiovascular:  Negative for chest pain, palpitations and leg swelling.   All other systems reviewed and are negative.        Vitals:    03/25/25 1418 03/25/25 1431   BP: 140/72 122/82   BP Location: Left arm    Patient Position: Sitting    Cuff Size: Large    Pulse: 60    Weight: 88.2 kg (194 lb 6.4 oz)    Height: 5' 3\" (1.6 m)        Vitals:    03/25/25 1418   Weight: 88.2 kg (194 lb 6.4 oz)       Height: 5' 3\" (160 cm)     Physical Exam:  General appearance:  Appears stated age, alert, well appearing and in no distress  HEENT:  PERRLA, EOMI, no scleral icterus, no " conjunctival pallor  NECK:  Supple, No elevated JVP, no thyromegaly, no carotid bruits  HEART:  Regular rate and rhythm, normal S1/S2, no S3/S4, no murmur or rub  LUNGS:  Clear to auscultation bilaterally  ABDOMEN:  Soft, non-tender, positive bowel sounds, no rebound or guarding, no organomegaly   EXTREMITIES:  No edema  VASCULAR:  Normal pedal pulses   SKIN: No lesions or rashes on exposed skin  NEURO:  CN II-XII intact, no focal deficits

## 2025-05-14 DIAGNOSIS — I10 ESSENTIAL HYPERTENSION: ICD-10-CM

## 2025-05-14 RX ORDER — LISINOPRIL AND HYDROCHLOROTHIAZIDE 10; 12.5 MG/1; MG/1
1 TABLET ORAL DAILY
Qty: 90 TABLET | Refills: 1 | Status: SHIPPED | OUTPATIENT
Start: 2025-05-14

## 2025-06-03 ENCOUNTER — HOSPITAL ENCOUNTER (OUTPATIENT)
Dept: RADIOLOGY | Age: 65
Discharge: HOME | End: 2025-06-03
Payer: MEDICARE

## 2025-06-03 VITALS — WEIGHT: 195 LBS | HEIGHT: 63 IN | BODY MASS INDEX: 34.55 KG/M2

## 2025-06-03 DIAGNOSIS — Z12.31 VISIT FOR SCREENING MAMMOGRAM: ICD-10-CM

## 2025-06-03 PROCEDURE — 77067 SCR MAMMO BI INCL CAD: CPT

## 2025-06-03 PROCEDURE — 77063 BREAST TOMOSYNTHESIS BI: CPT

## 2025-06-11 ENCOUNTER — OFFICE VISIT (OUTPATIENT)
Dept: FAMILY MEDICINE CLINIC | Facility: CLINIC | Age: 65
End: 2025-06-11
Payer: MEDICARE

## 2025-06-11 ENCOUNTER — APPOINTMENT (OUTPATIENT)
Dept: LAB | Facility: CLINIC | Age: 65
End: 2025-06-11
Payer: MEDICARE

## 2025-06-11 VITALS
TEMPERATURE: 97.6 F | HEART RATE: 70 BPM | RESPIRATION RATE: 18 BRPM | SYSTOLIC BLOOD PRESSURE: 128 MMHG | OXYGEN SATURATION: 96 % | DIASTOLIC BLOOD PRESSURE: 74 MMHG | WEIGHT: 192.4 LBS | BODY MASS INDEX: 34.08 KG/M2

## 2025-06-11 DIAGNOSIS — Z00.00 MEDICARE ANNUAL WELLNESS VISIT, INITIAL: Primary | ICD-10-CM

## 2025-06-11 DIAGNOSIS — I10 BENIGN ESSENTIAL HYPERTENSION: ICD-10-CM

## 2025-06-11 DIAGNOSIS — E78.5 DYSLIPIDEMIA: ICD-10-CM

## 2025-06-11 DIAGNOSIS — E78.2 MIXED HYPERLIPIDEMIA: ICD-10-CM

## 2025-06-11 DIAGNOSIS — Z78.0 MENOPAUSE: ICD-10-CM

## 2025-06-11 LAB
ALBUMIN SERPL BCG-MCNC: 4.4 G/DL (ref 3.5–5)
ALP SERPL-CCNC: 81 U/L (ref 34–104)
ALT SERPL W P-5'-P-CCNC: 18 U/L (ref 7–52)
ANION GAP SERPL CALCULATED.3IONS-SCNC: 10 MMOL/L (ref 4–13)
AST SERPL W P-5'-P-CCNC: 19 U/L (ref 13–39)
BASOPHILS # BLD AUTO: 0.06 THOUSANDS/ÂΜL (ref 0–0.1)
BASOPHILS NFR BLD AUTO: 1 % (ref 0–1)
BILIRUB SERPL-MCNC: 0.43 MG/DL (ref 0.2–1)
BUN SERPL-MCNC: 17 MG/DL (ref 5–25)
CALCIUM SERPL-MCNC: 9.7 MG/DL (ref 8.4–10.2)
CHLORIDE SERPL-SCNC: 101 MMOL/L (ref 96–108)
CHOLEST SERPL-MCNC: 200 MG/DL (ref ?–200)
CO2 SERPL-SCNC: 29 MMOL/L (ref 21–32)
CREAT SERPL-MCNC: 0.86 MG/DL (ref 0.6–1.3)
EOSINOPHIL # BLD AUTO: 0.15 THOUSAND/ÂΜL (ref 0–0.61)
EOSINOPHIL NFR BLD AUTO: 2 % (ref 0–6)
ERYTHROCYTE [DISTWIDTH] IN BLOOD BY AUTOMATED COUNT: 12.3 % (ref 11.6–15.1)
GFR SERPL CREATININE-BSD FRML MDRD: 71 ML/MIN/1.73SQ M
GLUCOSE P FAST SERPL-MCNC: 101 MG/DL (ref 65–99)
HCT VFR BLD AUTO: 43 % (ref 34.8–46.1)
HDLC SERPL-MCNC: 54 MG/DL
HGB BLD-MCNC: 14.6 G/DL (ref 11.5–15.4)
IMM GRANULOCYTES # BLD AUTO: 0.03 THOUSAND/UL (ref 0–0.2)
IMM GRANULOCYTES NFR BLD AUTO: 1 % (ref 0–2)
LDLC SERPL CALC-MCNC: 116 MG/DL (ref 0–100)
LYMPHOCYTES # BLD AUTO: 2.19 THOUSANDS/ÂΜL (ref 0.6–4.47)
LYMPHOCYTES NFR BLD AUTO: 35 % (ref 14–44)
MCH RBC QN AUTO: 29.1 PG (ref 26.8–34.3)
MCHC RBC AUTO-ENTMCNC: 34 G/DL (ref 31.4–37.4)
MCV RBC AUTO: 86 FL (ref 82–98)
MONOCYTES # BLD AUTO: 0.59 THOUSAND/ÂΜL (ref 0.17–1.22)
MONOCYTES NFR BLD AUTO: 9 % (ref 4–12)
NEUTROPHILS # BLD AUTO: 3.31 THOUSANDS/ÂΜL (ref 1.85–7.62)
NEUTS SEG NFR BLD AUTO: 52 % (ref 43–75)
NRBC BLD AUTO-RTO: 0 /100 WBCS
PLATELET # BLD AUTO: 284 THOUSANDS/UL (ref 149–390)
PMV BLD AUTO: 10.9 FL (ref 8.9–12.7)
POTASSIUM SERPL-SCNC: 4.4 MMOL/L (ref 3.5–5.3)
PROT SERPL-MCNC: 7.3 G/DL (ref 6.4–8.4)
RBC # BLD AUTO: 5.01 MILLION/UL (ref 3.81–5.12)
SODIUM SERPL-SCNC: 140 MMOL/L (ref 135–147)
TRIGL SERPL-MCNC: 151 MG/DL (ref ?–150)
TSH SERPL DL<=0.05 MIU/L-ACNC: 1.63 UIU/ML (ref 0.45–4.5)
WBC # BLD AUTO: 6.33 THOUSAND/UL (ref 4.31–10.16)

## 2025-06-11 PROCEDURE — 80053 COMPREHEN METABOLIC PANEL: CPT

## 2025-06-11 PROCEDURE — 99213 OFFICE O/P EST LOW 20 MIN: CPT | Performed by: FAMILY MEDICINE

## 2025-06-11 PROCEDURE — G2211 COMPLEX E/M VISIT ADD ON: HCPCS | Performed by: FAMILY MEDICINE

## 2025-06-11 PROCEDURE — 80061 LIPID PANEL: CPT

## 2025-06-11 PROCEDURE — 84443 ASSAY THYROID STIM HORMONE: CPT

## 2025-06-11 PROCEDURE — 85025 COMPLETE CBC W/AUTO DIFF WBC: CPT

## 2025-06-11 PROCEDURE — 36415 COLL VENOUS BLD VENIPUNCTURE: CPT

## 2025-06-11 PROCEDURE — G0438 PPPS, INITIAL VISIT: HCPCS | Performed by: FAMILY MEDICINE

## 2025-06-11 NOTE — PATIENT INSTRUCTIONS
please proceed with Prevnar 20, pneumonia vaccine, once in the life time  I recommend flu vaccine this fall  12-hour fasting blood work  DEXA scan ordered

## 2025-06-11 NOTE — PROGRESS NOTES
Name: Estefania Douglas      : 1960      MRN: [id number]  Encounter Provider: Madina Davies MD  Encounter Date: 2025   Encounter department: [facility]  :  Assessment & Plan  Medicare annual wellness visit, initial         Menopause  Proceed with baseline DEXA scan  Orders:  •  DXA bone density spine hip and pelvis; Future    Mixed hyperlipidemia  Negative calcium score of 0 in . LBBB.   Currently on pravastatin 40 mg daily.  Proceed with blood work.  Orders:  •  Comprehensive metabolic panel; Future  •  Lipid Panel with Direct LDL reflex; Future  •  TSH, 3rd generation; Future    Benign essential hypertension  Blood pressure is well-controlled.  Continue current Rx  Orders:  •  CBC and differential; Future       Preventive health issues were discussed with patient, and age appropriate screening tests were ordered as noted in patient's After Visit Summary. Personalized health advice and appropriate referrals for health education or preventive services given if needed, as noted in patient's After Visit Summary.    Patient Instructions   please proceed with Prevnar 20, pneumonia vaccine, once in the life time  I recommend flu vaccine this fall  12-hour fasting blood work  DEXA scan ordered    Return in about 1 year (around 2026) for Medicare wellness.    History of Present Illness     Medicare wellness and follow-up   Patient has recently retired   She feels well.  Offers no complaints   Patient is under care of [facility]ology   She remains on Rx for hypertension and hyperlipidemia, started taking pravastatin 40 mg daily, last LDL was 109, calcium score was negative a few years ago.       Patient Care Team:  Madina Davies MD as PCP - General  MD Colette Pope DO    Review of Systems   Constitutional: Negative.    HENT: Negative.     Eyes: Negative.    Respiratory: Negative.     Cardiovascular: Negative.    Gastrointestinal: Negative.     Endocrine: Negative.    Genitourinary: Negative.    Musculoskeletal: Negative.    Skin: Negative.    Allergic/Immunologic: Negative.    Neurological: Negative.    Hematological: Negative.    Psychiatric/Behavioral: Negative.       Medical History Reviewed by provider this encounter:  Tobacco  Allergies  Meds  Problems  Med Hx  Surg Hx  Fam Hx       Annual Wellness Visit Questionnaire   Estefania is here for her Initial Wellness visit. Last Medicare Wellness visit information reviewed, patient interviewed and updates made to the record today.      Health Risk Assessment:   Patient rates overall health as good. Patient feels that their physical health rating is same. Patient is satisfied with their life. Eyesight was rated as same. Hearing was rated as same. Patient feels that their emotional and mental health rating is same. Patients states they are never, rarely angry. Patient states they are sometimes unusually tired/fatigued. Pain experienced in the last 7 days has been none. Patient states that she has experienced no weight loss or gain in last 6 months.     Depression Screening:   PHQ-2 Score: 0      Fall Risk Screening:   In the past year, patient has experienced: no history of falling in past year      Urinary Incontinence Screening:   Patient has not leaked urine accidently in the last six months.     Home Safety:  Patient does not have trouble with stairs inside or outside of their home. Patient has working smoke alarms and has working carbon monoxide detector. Home safety hazards include: none.     Nutrition:   Current diet is Regular.     Medications:   Patient is currently taking over-the-counter supplements. OTC medications include: see medication list. Patient is able to manage medications.     Activities of Daily Living (ADLs)/Instrumental Activities of Daily Living (IADLs):   Walk and transfer into and out of bed and chair?: Yes  Dress and groom yourself?: Yes    Bathe or shower yourself?: Yes     Feed yourself? Yes  Do your laundry/housekeeping?: Yes  Manage your money, pay your bills and track your expenses?: Yes  Make your own meals?: Yes    Do your own shopping?: Yes    Previous Hospitalizations:   Any hospitalizations or ED visits within the last 12 months?: No      Advance Care Planning:   Living will: Yes    Durable POA for healthcare: Yes    Advanced directive: Yes      Cognitive Screening:   Provider or family/friend/caregiver concerned regarding cognition?: No    Preventive Screenings      Cardiovascular Screening:    General: Screening Not Indicated and History Lipid Disorder      Diabetes Screening:     General: Risks and Benefits Discussed    Due for: Blood Glucose      Colorectal Cancer Screening:     General: Screening Current      Breast Cancer Screening:     General: Screening Current      Cervical Cancer Screening:    General: Screening Not Indicated and Screening Current      Osteoporosis Screening:    General: Risks and Benefits Discussed    Due for: DXA Axial      Abdominal Aortic Aneurysm (AAA) Screening:        General: Screening Current      Lung Cancer Screening:     General: Screening Not Indicated      Hepatitis C Screening:    General: Screening Current    Immunizations:  - Immunizations due: Prevnar 20  - Risks/benefits immunizations discussed      Cardiovascular Risk Assessment:  Patient will proceed with vaccination at the local pharmacy    Screening, Brief Intervention, and Referral to Treatment (SBIRT)     Screening  Typical number of drinks in a day: 0  Typical number of drinks in a week: 0  Interpretation: Low risk drinking behavior.    Single Item Drug Screening:  How often have you used an illegal drug (including marijuana) or a prescription medication for non-medical reasons in the past year? never    Single Item Drug Screen Score: 0  Interpretation: Negative screen for possible drug use disorder    Brief Intervention  Alcohol & drug use screenings were reviewed. No  concerns regarding substance use disorder identified.     Other Counseling Topics:   Calcium and vitamin D intake and regular weightbearing exercise.     Social Drivers of Health     Food Insecurity: No Food Insecurity (6/11/2025)    Nursing - Inadequate Food Risk Classification    • Worried About Running Out of Food in the Last Year: Never true    • Ran Out of Food in the Last Year: Never true   Transportation Needs: No Transportation Needs (6/11/2025)    PRAPARE - Transportation    • Lack of Transportation (Medical): No    • Lack of Transportation (Non-Medical): No   Housing Stability: Low Risk  (6/11/2025)    Housing Stability Vital Sign    • Unable to Pay for Housing in the Last Year: No    • Number of Times Moved in the Last Year: 0    • Homeless in the Last Year: No   Utilities: Not At Risk (6/11/2025)    [facility] Utilities    • Threatened with loss of utilities: No     No results found.    Objective   /74 (BP Location: Left arm, Patient Position: Sitting, Cuff Size: Large)   Pulse 70   Temp 97.6 °F (36.4 °C) (Temporal)   Resp 18   Wt 87.3 kg (192 lb 6.4 oz)   SpO2 96%   BMI 34.08 kg/m²     Physical Exam  Vitals and nursing note reviewed.   Constitutional:       General: She is not in acute distress.     Appearance: Normal appearance. She is well-developed. She is not ill-appearing.   HENT:      Head: Normocephalic and atraumatic.     Eyes:      General: No scleral icterus.     Conjunctiva/sclera: Conjunctivae normal.     Neck:      Vascular: No carotid bruit.     Cardiovascular:      Rate and Rhythm: Normal rate and regular rhythm.      Heart sounds: Normal heart sounds. No murmur heard.  Pulmonary:      Effort: Pulmonary effort is normal. No respiratory distress.      Breath sounds: Normal breath sounds.   Abdominal:      General: Bowel sounds are normal. There is no abdominal bruit.      Palpations: Abdomen is soft.     Musculoskeletal:      Cervical back: Neck supple. No rigidity.      Right lower  leg: No edema.      Left lower leg: No edema.     Skin:     General: Skin is warm.     Neurological:      General: No focal deficit present.      Mental Status: She is alert and oriented to person, place, and time.     Psychiatric:         Mood and Affect: Mood normal.         Behavior: Behavior normal.         Thought Content: Thought content normal.

## 2025-06-11 NOTE — ASSESSMENT & PLAN NOTE
Negative calcium score of 0 in 2023. LBBB.   Currently on pravastatin 40 mg daily.  Proceed with blood work.  Orders:  •  Comprehensive metabolic panel; Future  •  Lipid Panel with Direct LDL reflex; Future  •  TSH, 3rd generation; Future

## 2025-06-14 PROBLEM — J02.9 SORE THROAT: Status: RESOLVED | Noted: 2024-09-19 | Resolved: 2025-06-14

## 2025-06-15 ENCOUNTER — RESULTS FOLLOW-UP (OUTPATIENT)
Dept: FAMILY MEDICINE CLINIC | Facility: CLINIC | Age: 65
End: 2025-06-15

## 2025-06-20 DIAGNOSIS — E78.2 MIXED HYPERLIPIDEMIA: Primary | ICD-10-CM

## 2025-06-20 RX ORDER — ROSUVASTATIN CALCIUM 10 MG/1
10 TABLET, COATED ORAL DAILY
Qty: 100 TABLET | Refills: 1 | Status: SHIPPED | OUTPATIENT
Start: 2025-06-20